# Patient Record
Sex: MALE | Race: WHITE | NOT HISPANIC OR LATINO | Employment: OTHER | URBAN - METROPOLITAN AREA
[De-identification: names, ages, dates, MRNs, and addresses within clinical notes are randomized per-mention and may not be internally consistent; named-entity substitution may affect disease eponyms.]

---

## 2019-01-21 ENCOUNTER — TRANSFERRED RECORDS (OUTPATIENT)
Dept: HEALTH INFORMATION MANAGEMENT | Facility: CLINIC | Age: 64
End: 2019-01-21

## 2019-02-13 ENCOUNTER — ALLIED HEALTH/NURSE VISIT (OUTPATIENT)
Dept: NURSING | Facility: CLINIC | Age: 64
End: 2019-02-13
Payer: COMMERCIAL

## 2019-02-13 DIAGNOSIS — Z23 NEED FOR VACCINATION: Primary | ICD-10-CM

## 2019-02-13 PROCEDURE — 99207 ZZC NO CHARGE LOS: CPT

## 2019-02-13 PROCEDURE — 90750 HZV VACC RECOMBINANT IM: CPT

## 2019-02-13 PROCEDURE — 90471 IMMUNIZATION ADMIN: CPT

## 2019-02-13 NOTE — PROGRESS NOTES
Prior to injection, verified patient identity using patient's name and date of birth.  Due to injection administration, patient instructed to remain in clinic for 15 minutes  afterwards, and to report any adverse reaction to me immediately.    Screening Questionnaire for Adult Immunization    Are you sick today?   No   Do you have allergies to medications, food, a vaccine component or latex?   No   Have you ever had a serious reaction after receiving a vaccination?   No   Do you have a long-term health problem with heart disease, lung disease, asthma, kidney disease, metabolic disease (e.g. diabetes), anemia, or other blood disorder?   No   Do you have cancer, leukemia, HIV/AIDS, or any other immune system problem?   No   In the past 3 months, have you taken medications that affect  your immune system, such as prednisone, other steroids, or anticancer drugs; drugs for the treatment of rheumatoid arthritis, Crohn s disease, or psoriasis; or have you had radiation treatments?   No   Have you had a seizure, or a brain or other nervous system problem?   No   During the past year, have you received a transfusion of blood or blood     products, or been given immune (gamma) globulin or antiviral drug?   No   For women: Are you pregnant or is there a chance you could become        pregnant during the next month?   No   Have you received any vaccinations in the past 4 weeks?   No     Immunization questionnaire answers were all negative.        Per orders of Dr. Dean, injection of Shingrix given by Palomo Florian. Patient instructed to remain in clinic for 15 minutes afterwards, and to report any adverse reaction to me immediately.       Screening performed by Palomo Florian on 2/13/2019 at 4:59 PM.

## 2019-05-13 ENCOUNTER — ALLIED HEALTH/NURSE VISIT (OUTPATIENT)
Dept: NURSING | Facility: CLINIC | Age: 64
End: 2019-05-13
Payer: COMMERCIAL

## 2019-05-13 DIAGNOSIS — Z23 NEED FOR VACCINATION: Primary | ICD-10-CM

## 2019-05-13 PROCEDURE — 90471 IMMUNIZATION ADMIN: CPT

## 2019-05-13 PROCEDURE — 99207 ZZC NO CHARGE NURSE ONLY: CPT

## 2019-05-13 PROCEDURE — 90750 HZV VACC RECOMBINANT IM: CPT

## 2019-05-13 NOTE — NURSING NOTE
Screening Questionnaire for Adult Immunization    Are you sick today?   No   Do you have allergies to medications, food, a vaccine component or latex?   No   Have you ever had a serious reaction after receiving a vaccination?   No   Do you have a long-term health problem with heart disease, lung disease, asthma, kidney disease, metabolic disease (e.g. diabetes), anemia, or other blood disorder?   No   Do you have cancer, leukemia, HIV/AIDS, or any other immune system problem?   No   In the past 3 months, have you taken medications that affect  your immune system, such as prednisone, other steroids, or anticancer drugs; drugs for the treatment of rheumatoid arthritis, Crohn s disease, or psoriasis; or have you had radiation treatments?   No   Have you had a seizure, or a brain or other nervous system problem?   No   During the past year, have you received a transfusion of blood or blood     products, or been given immune (gamma) globulin or antiviral drug?   No   For women: Are you pregnant or is there a chance you could become        pregnant during the next month?   No   Have you received any vaccinations in the past 4 weeks?   No     Immunization questionnaire answers were all negative.        Per orders of Dr. Dean, injection of Shingrix#2 given by Carolina White. Patient instructed to remain in clinic for 15 minutes afterwards, and to report any adverse reaction to me immediately.     Prior to injection, verified patient identity using patient's name and date of birth.  Due to injection administration, patient instructed to remain in clinic for 15 minutes  afterwards, and to report any adverse reaction to me immediately.      Screening performed by Carolina White on 5/13/2019 at 4:32 PM.

## 2019-10-26 ENCOUNTER — ANCILLARY PROCEDURE (OUTPATIENT)
Dept: GENERAL RADIOLOGY | Facility: CLINIC | Age: 64
End: 2019-10-26
Attending: PHYSICIAN ASSISTANT
Payer: COMMERCIAL

## 2019-10-26 ENCOUNTER — OFFICE VISIT (OUTPATIENT)
Dept: URGENT CARE | Facility: URGENT CARE | Age: 64
End: 2019-10-26
Payer: COMMERCIAL

## 2019-10-26 VITALS
SYSTOLIC BLOOD PRESSURE: 104 MMHG | DIASTOLIC BLOOD PRESSURE: 68 MMHG | WEIGHT: 180 LBS | BODY MASS INDEX: 23.11 KG/M2 | HEART RATE: 56 BPM | TEMPERATURE: 98 F

## 2019-10-26 DIAGNOSIS — M79.671 RIGHT FOOT PAIN: ICD-10-CM

## 2019-10-26 DIAGNOSIS — M79.671 RIGHT FOOT PAIN: Primary | ICD-10-CM

## 2019-10-26 LAB
BASOPHILS # BLD AUTO: 0 10E9/L (ref 0–0.2)
BASOPHILS NFR BLD AUTO: 0.4 %
DIFFERENTIAL METHOD BLD: NORMAL
EOSINOPHIL # BLD AUTO: 0.2 10E9/L (ref 0–0.7)
EOSINOPHIL NFR BLD AUTO: 2.7 %
LYMPHOCYTES # BLD AUTO: 1.6 10E9/L (ref 0.8–5.3)
LYMPHOCYTES NFR BLD AUTO: 28 %
MONOCYTES # BLD AUTO: 0.7 10E9/L (ref 0–1.3)
MONOCYTES NFR BLD AUTO: 12.3 %
NEUTROPHILS # BLD AUTO: 3.1 10E9/L (ref 1.6–8.3)
NEUTROPHILS NFR BLD AUTO: 56.6 %
URATE SERPL-MCNC: 4.7 MG/DL (ref 3.5–7.2)
WBC # BLD AUTO: 5.5 10E9/L (ref 4–11)

## 2019-10-26 PROCEDURE — 73630 X-RAY EXAM OF FOOT: CPT | Mod: RT

## 2019-10-26 PROCEDURE — 85004 AUTOMATED DIFF WBC COUNT: CPT | Performed by: PHYSICIAN ASSISTANT

## 2019-10-26 PROCEDURE — 85048 AUTOMATED LEUKOCYTE COUNT: CPT | Performed by: PHYSICIAN ASSISTANT

## 2019-10-26 PROCEDURE — 84550 ASSAY OF BLOOD/URIC ACID: CPT | Performed by: PHYSICIAN ASSISTANT

## 2019-10-26 PROCEDURE — 36415 COLL VENOUS BLD VENIPUNCTURE: CPT | Performed by: PHYSICIAN ASSISTANT

## 2019-10-26 PROCEDURE — 99203 OFFICE O/P NEW LOW 30 MIN: CPT | Performed by: PHYSICIAN ASSISTANT

## 2019-10-26 RX ORDER — UREA 10 %
1 LOTION (ML) TOPICAL
COMMUNITY
Start: 2017-07-20

## 2019-10-26 RX ORDER — INDOMETHACIN 50 MG/1
50 CAPSULE ORAL
Qty: 30 CAPSULE | Refills: 0 | Status: SHIPPED | OUTPATIENT
Start: 2019-10-26 | End: 2021-02-02

## 2019-10-26 RX ORDER — LOSARTAN POTASSIUM 100 MG/1
50 TABLET ORAL DAILY
COMMUNITY
Start: 2019-05-18

## 2019-10-26 NOTE — PATIENT INSTRUCTIONS
Patient Education     Gout    Gout is an inflammation of a joint due to a build-up of gout crystals in the joint fluid. This occurs when there is an excess of uric acid (a normal waste product) in the body. Uric acid builds up in the body when the kidneys are unable to filter enough of it from the blood. This may occur with age. It is also associated with kidney disease. Gout occurs more often in people with obesity, diabetes, high blood pressure, or high levels of fats in the blood. It may run in families. Gout tends to come and go. A flare up of gout is called an attack. Drinking alcohol or eating certain foods (such as shellfish or foods with additives such as high-fructose corn syrup) may increase uric acid levels in the blood and cause a gout attack.  During a gout attack, the affected joint may become a hot, red, swollen and painful. If you have had one attack of gout, you are likely to have another. An attack of gout can be treated with medicine. If these attacks become frequent, a daily medicine may be prescribed to help the kidneys remove uric acid from the body.  Home care  During a gout attack:    Rest painful joints. If gout affects the joints of your foot or leg, you may want to use crutches for the first few days to keep from bearing weight on the affected joint.    When sitting or lying down, raise the painful joint to a level higher than your heart.    Apply an ice pack (ice cubes in a plastic bag wrapped in a thin towel) over the injured area for 20 minutes every 1 to 2 hours the first day for pain relief. Continue this 3 to 4 times a day for swelling and pain.    Avoid alcohol and foods listed below (see Preventing attacks) during a gout attack. Drink extra fluid to help flush the uric acid through your kidneys.    If you were prescribed a medicine to treat gout, take it as your healthcare provider has instructed. Don't skip doses.    Take anti-inflammatory medicine as directed.     If pain  medicines have been prescribed, take them exactly as directed.    Preventing attacks    Minimize or avoid alcohol use. Excess alcohol intake can cause a gout attack.    Limit these foods and beverages:  ? Organ meats, such as kidneys and liver  ? Certain seafoods (anchovies, sardines, shrimp, scallops, herring, mackerel)  ? Wild game, meat extracts and meat gravies  ? Foods and beverages sweetened with high-fructose corn syrup, such as sodas    Eat a healthy diet including low-fat and nonfat dairy, whole grains, and vegetables.    If you are overweight, talk to your healthcare provider about a weight reduction plan. Avoid fasting or extreme low calorie diets (less than 900 calories per day). This will increase uric acid levels in the body.    If you have diabetes or high blood pressure, work with your doctor to manage these conditions.    Protect the joint from injury. Trauma can trigger a gout attack.  Follow-up care  Follow up with your healthcare provider, or as advised.   When to seek medical advice  Call your healthcare provider if you have any of the following:    Fever over 100.4 F (38. C) with worsening joint pain    Increasing redness around the joint    Pain developing in another joint    Repeated vomiting, abdominal pain, or blood in the vomit or stool (black or red color)  Date Last Reviewed: 3/1/2017    0805-1209 [employer]. [address] [place] [address], [place] [id number]. All rights reserved. This information is not intended as a substitute for professional medical care. Always follow your healthcare professional's instructions.           Patient Education     Treating Gout Attacks     Raising the joint above the level of your heart can help reduce gout symptoms.     Gout is a disease that affects the joints. It is caused by excess uric acid in your blood that may lead to crystals forming in your joints. Left untreated, it can lead to painful foot and joint deformities and even kidney  problems. But, by treating gout early, you can relieve pain and help prevent future problems. Gout can usually be treated with medicine and proper diet. In severe cases, surgery may be needed.  Gout attacks are painful and often happen more than once. Taking medicines may reduce pain and prevent attacks in the future. There are also some things you can do at home to relieve symptoms.  Medicines for gout  Your healthcare provider may prescribe a daily medicine to reduce levels of uric acid. Reducing your uric acid levels may help prevent gout attacks. Allopurinol is one commonly used medicine taken daily to reduce uric acid levels. Other daily medicines used to reduce uric acid levels include febuxostat, lesinurad, and probencid. Other medicines can help relieve pain and swelling during an acute attack. Medicines such as NSAIDs (nonsteroidal anti-inflammatory medicines), steroids, and colchicine may be prescribed for intermittent use to relieve an acute gout attack. Be sure to take your medicine as directed.  What you can do  Below are some things you can do at home to relieve gout symptoms. Your healthcare provider may have other tips.    Rest the painful joint as much as you can.    Raise the painful joint so it is at a level higher than your heart.    Use ice for 10 minutes every 1 to 2 hours as possible.  How can I prevent gout?  With a little effort, you may be able to prevent gout attacks in the future. Here are some things you can do:    Don't eat foods high in purines  ? Certain meats (red meat, processed meat, turkey)  ? Organ meats (kidney, liver, sweetbread)  ? Shellfish (lobster, crab, shrimp, scallop, mussel)  ? Certain fish (anchovy, sardine, herring, mackerel)    Take any medicines prescribed by your healthcare provider.    Lose weight if you need to.    Reduce high fructose corn syrup in meals and drinks.    Reduce or cut out alcohol, particularly beer, but also red wine and spirits.    Control blood  pressure, diabetes, and cholesterol.    Drink plenty of water to help flush uric acid from your body.  Date Last Reviewed: 4/1/2018 2000-2018 [employer]. [address] [place] [address], [place] [address]. All rights reserved. This information is not intended as a substitute for professional medical care. Always follow your healthcare professional's instructions.

## 2019-10-26 NOTE — PROGRESS NOTES
Patient presents with:  Urgent Care: right foot pain that started this morning.     SUBJECTIVE:  Chief Complaint   Patient presents with     Urgent Care     right foot pain that started this morning.      Anette Valdes is a 64 year old male presents with a chief complaint of right foot pain, onset this morning.  Denies any injury, however onset was while working in yard today.    Denies any rash or lesions.      He did have some red meat and alcohol recently.    SH: the is here with his wife today.      Past Medical History:   Diagnosis Date     AAA (abdominal aortic aneurysm) (H) 2012 and 2013    had endovascular repair 11/12 at [facility].  Had open repair 12/13 at [facility] with post op ileus     B12 deficiency 2016    found at [facility] [place], started po and shots     Dilated aortic root (H) 1/16    4.3 on echo, found on est echo done for l.h.     Elevated blood sugar      Elevated MCV 1/15     Light headed 12/15    neg est echo     SBO (small bowel obstruction) (H) 2007    due to volvulus, had surgery     Splenic artery aneurysm (H) 2007    had embolization     Patient Active Problem List   Diagnosis     Splenic artery aneurysm (H)     CARDIOVASCULAR SCREENING; LDL GOAL LESS THAN 160     Elevated MCV     Elevated blood sugar     Dilated aortic root (H)     Abdominal aortic aneurysm without rupture (H)     Vitamin B12 deficiency (non anemic)     Social History     Tobacco Use     Smoking status: Never Smoker     Smokeless tobacco: Never Used   Substance Use Topics     Alcohol use: Yes     Alcohol/week: 10.0 standard drinks     Types: 10 Standard drinks or equivalent per week       ROS:  CONSTITUTIONAL:NEGATIVE for fever, chills, change in weight  INTEGUMENTARY/SKIN: NEGATIVE for worrisome rashes, moles or lesions  EYES: NEGATIVE for vision changes or irritation  ENT/MOUTH: NEGATIVE for ear, mouth and throat problems  RESP:NEGATIVE for significant cough or SOB  CV: NEGATIVE for chest pain, palpitations or peripheral  edema  GI: NEGATIVE for nausea, abdominal pain, heartburn, or change in bowel habits  : negative for dysuria, hematuria, decreased urinary stream, erectile dysfunction  MUSCULOSKELETAL: as per HPI  NEURO: NEGATIVE for weakness, dizziness or paresthesias  Review of systems negative except as stated above.    EXAM:   /68   Pulse 56   Temp 98  F (36.7  C) (Oral)   Wt 81.6 kg (180 lb)   BMI 23.11 kg/m    Gen: healthy,alert,no distress  Extremity: Right foot:  has tenderness at mid lateral foot without erythema or swelling.   There is not compromise to the distal circulation.  Pulses are +2 and CRT is brisk  SKIN: no suspicious lesions or rashes  NEURO: Normal strength and tone, sensory exam grossly normal, mentation intact and speech normal    X-RAY was done.  No fractures as per my interpretation during clinic visit     (M79.671) Right foot pain  (primary encounter diagnosis)  Comment: consistent with possible gout  Plan: Uric acid, WBC with Diff, XR Foot Right G/E 3         Views, indomethacin (INDOCIN) 50 MG capsule          See handout

## 2020-01-19 ENCOUNTER — TRANSFERRED RECORDS (OUTPATIENT)
Dept: HEALTH INFORMATION MANAGEMENT | Facility: CLINIC | Age: 65
End: 2020-01-19

## 2021-02-02 ENCOUNTER — OFFICE VISIT (OUTPATIENT)
Dept: FAMILY MEDICINE | Facility: CLINIC | Age: 66
End: 2021-02-02
Payer: COMMERCIAL

## 2021-02-02 VITALS
OXYGEN SATURATION: 99 % | HEIGHT: 74 IN | DIASTOLIC BLOOD PRESSURE: 72 MMHG | HEART RATE: 61 BPM | WEIGHT: 178 LBS | TEMPERATURE: 97 F | BODY MASS INDEX: 22.84 KG/M2 | SYSTOLIC BLOOD PRESSURE: 108 MMHG

## 2021-02-02 DIAGNOSIS — R73.9 ELEVATED BLOOD SUGAR: ICD-10-CM

## 2021-02-02 DIAGNOSIS — R71.8 ELEVATED MCV: ICD-10-CM

## 2021-02-02 DIAGNOSIS — E53.8 VITAMIN B12 DEFICIENCY (NON ANEMIC): ICD-10-CM

## 2021-02-02 DIAGNOSIS — I71.40 ABDOMINAL AORTIC ANEURYSM WITHOUT RUPTURE (H): ICD-10-CM

## 2021-02-02 DIAGNOSIS — G30.8 ALZHEIMER'S DISEASE OF OTHER ONSET WITHOUT BEHAVIORAL DISTURBANCE: ICD-10-CM

## 2021-02-02 DIAGNOSIS — I77.810 DILATED AORTIC ROOT (H): ICD-10-CM

## 2021-02-02 DIAGNOSIS — I72.8 SPLENIC ARTERY ANEURYSM (H): ICD-10-CM

## 2021-02-02 DIAGNOSIS — F02.80 ALZHEIMER'S DISEASE OF OTHER ONSET WITHOUT BEHAVIORAL DISTURBANCE: ICD-10-CM

## 2021-02-02 DIAGNOSIS — Z00.00 ROUTINE GENERAL MEDICAL EXAMINATION AT A HEALTH CARE FACILITY: Primary | ICD-10-CM

## 2021-02-02 DIAGNOSIS — Q87.40 MARFAN'S SYNDROME: ICD-10-CM

## 2021-02-02 DIAGNOSIS — R63.4 WEIGHT LOSS: ICD-10-CM

## 2021-02-02 LAB
ALBUMIN SERPL-MCNC: 3.8 G/DL (ref 3.4–5)
ALP SERPL-CCNC: 46 U/L (ref 40–150)
ALT SERPL W P-5'-P-CCNC: 20 U/L (ref 0–70)
ANION GAP SERPL CALCULATED.3IONS-SCNC: 3 MMOL/L (ref 3–14)
AST SERPL W P-5'-P-CCNC: 18 U/L (ref 0–45)
BASOPHILS # BLD AUTO: 0 10E9/L (ref 0–0.2)
BASOPHILS NFR BLD AUTO: 0.6 %
BILIRUB SERPL-MCNC: 0.6 MG/DL (ref 0.2–1.3)
BUN SERPL-MCNC: 30 MG/DL (ref 7–30)
CALCIUM SERPL-MCNC: 9.4 MG/DL (ref 8.5–10.1)
CHLORIDE SERPL-SCNC: 108 MMOL/L (ref 94–109)
CHOLEST SERPL-MCNC: 173 MG/DL
CO2 SERPL-SCNC: 27 MMOL/L (ref 20–32)
CREAT SERPL-MCNC: 0.9 MG/DL (ref 0.66–1.25)
DIFFERENTIAL METHOD BLD: ABNORMAL
EOSINOPHIL # BLD AUTO: 0.1 10E9/L (ref 0–0.7)
EOSINOPHIL NFR BLD AUTO: 1 %
ERYTHROCYTE [DISTWIDTH] IN BLOOD BY AUTOMATED COUNT: 12.4 % (ref 10–15)
GFR SERPL CREATININE-BSD FRML MDRD: 90 ML/MIN/{1.73_M2}
GLUCOSE SERPL-MCNC: 86 MG/DL (ref 70–99)
HCT VFR BLD AUTO: 41.7 % (ref 40–53)
HCV AB SERPL QL IA: NONREACTIVE
HDLC SERPL-MCNC: 54 MG/DL
HGB BLD-MCNC: 14 G/DL (ref 13.3–17.7)
HIV 1+2 AB+HIV1 P24 AG SERPL QL IA: NONREACTIVE
LDLC SERPL CALC-MCNC: 102 MG/DL
LYMPHOCYTES # BLD AUTO: 1.3 10E9/L (ref 0.8–5.3)
LYMPHOCYTES NFR BLD AUTO: 26.6 %
MCH RBC QN AUTO: 33.4 PG (ref 26.5–33)
MCHC RBC AUTO-ENTMCNC: 33.6 G/DL (ref 31.5–36.5)
MCV RBC AUTO: 100 FL (ref 78–100)
MONOCYTES # BLD AUTO: 0.6 10E9/L (ref 0–1.3)
MONOCYTES NFR BLD AUTO: 12.2 %
NEUTROPHILS # BLD AUTO: 2.9 10E9/L (ref 1.6–8.3)
NEUTROPHILS NFR BLD AUTO: 59.6 %
NONHDLC SERPL-MCNC: 119 MG/DL
PLATELET # BLD AUTO: 177 10E9/L (ref 150–450)
POTASSIUM SERPL-SCNC: 4.4 MMOL/L (ref 3.4–5.3)
PROT SERPL-MCNC: 6.7 G/DL (ref 6.8–8.8)
RBC # BLD AUTO: 4.19 10E12/L (ref 4.4–5.9)
SODIUM SERPL-SCNC: 138 MMOL/L (ref 133–144)
TRIGL SERPL-MCNC: 83 MG/DL
TSH SERPL DL<=0.005 MIU/L-ACNC: 0.78 MU/L (ref 0.4–4)
VIT B12 SERPL-MCNC: 2162 PG/ML (ref 193–986)
WBC # BLD AUTO: 4.9 10E9/L (ref 4–11)

## 2021-02-02 PROCEDURE — 86803 HEPATITIS C AB TEST: CPT | Performed by: INTERNAL MEDICINE

## 2021-02-02 PROCEDURE — 80050 GENERAL HEALTH PANEL: CPT | Performed by: INTERNAL MEDICINE

## 2021-02-02 PROCEDURE — 36415 COLL VENOUS BLD VENIPUNCTURE: CPT | Performed by: INTERNAL MEDICINE

## 2021-02-02 PROCEDURE — 87389 HIV-1 AG W/HIV-1&-2 AB AG IA: CPT | Performed by: INTERNAL MEDICINE

## 2021-02-02 PROCEDURE — 99386 PREV VISIT NEW AGE 40-64: CPT | Performed by: INTERNAL MEDICINE

## 2021-02-02 PROCEDURE — 82607 VITAMIN B-12: CPT | Performed by: INTERNAL MEDICINE

## 2021-02-02 PROCEDURE — 80061 LIPID PANEL: CPT | Performed by: INTERNAL MEDICINE

## 2021-02-02 SDOH — HEALTH STABILITY: MENTAL HEALTH: HOW OFTEN DO YOU HAVE A DRINK CONTAINING ALCOHOL?: NOT ASKED

## 2021-02-02 SDOH — HEALTH STABILITY: MENTAL HEALTH: HOW OFTEN DO YOU HAVE 6 OR MORE DRINKS ON ONE OCCASION?: NOT ASKED

## 2021-02-02 SDOH — HEALTH STABILITY: MENTAL HEALTH: HOW MANY STANDARD DRINKS CONTAINING ALCOHOL DO YOU HAVE ON A TYPICAL DAY?: NOT ASKED

## 2021-02-02 ASSESSMENT — MIFFLIN-ST. JEOR: SCORE: 1672.15

## 2021-02-02 NOTE — PROGRESS NOTES
SUBJECTIVE:   CC: Anette Valdes is an 65 year old male who presents for preventative health visit.     I have not seen this patient for over 3 years.  He has had [facility] follow up.  I reviewed tose notes.  Patient was dx with alz as noted and also Marmirandas, just had follow up on the latter and scans all stable.  Not on meds for the alz.  He is , 2 grown kids.   He offers no c/o on review of systems, works out reg    I also spoke with his wife today.        Patient has been advised of split billing requirements and indicates understanding: Yes  Healthy Habits:    Getting at least 3 servings of Calcium per day:  Yes    Bi-annual eye exam:  Yes    Dental care twice a year:  Yes    Sleep apnea or symptoms of sleep apnea:  None    Diet:  Regular (no restrictions)    Frequency of exercise:  6-7 days/week    Duration of exercise:  45-60 minutes    Taking medications regularly:  Yes    Barriers to taking medications:  None    Medication side effects:  None    PHQ-2 Total Score:    Additional concerns today:  No              Today's PHQ-2 Score:   PHQ-2 ( 1999 [employer]) 9/8/2016   Q1: Little interest or pleasure in doing things 0   Q2: Feeling down, depressed or hopeless 0   PHQ-2 Score 0       Abuse: Current or Past(Physical, Sexual or Emotional)- No  Do you feel safe in your environment? Yes    Have you ever done Advance Care Planning? (For example, a Health Directive, POLST, or a discussion with a medical provider or your loved ones about your wishes): Yes, advance care planning is on file.    Social History     Tobacco Use     Smoking status: Never Smoker     Smokeless tobacco: Never Used   Substance Use Topics     Alcohol use: Yes     Alcohol/week: 10.0 standard drinks     Types: 10 Standard drinks or equivalent per week     If you drink alcohol do you typically have >3 drinks per day or >7 drinks per week? No               Past Medical History:      Past Medical History:   Diagnosis Date     AAA (abdominal aortic  aneurysm) (H) 2012 and 2013    had endovascular repair 11/12 at [facility].  Had open repair 12/13 at [facility] with post op ileus     Alzheimer's disease (H)     dx [place]     B12 deficiency 01/01/2016    found at [facility] [place], started po and shots     Dilated aortic root (H) 01/01/2016    4.3 on echo, found on est echo done for l.h.     Elevated blood sugar      Elevated MCV 01/01/2015     Light headed 12/01/2015    neg est echo     Marfan's syndrome 2020    dx [place]     SBO (small bowel obstruction) (H) 01/01/2007    due to volvulus, had surgery     Splenic artery aneurysm (H) 01/01/2007    had embolization             Past Surgical History:      Past Surgical History:   Procedure Laterality Date     bunion surgery  2008     CATARACT IOL, RT/LT  1994     ENDOVASCULAR REPAIR ANEURYSM ABDOMINAL AORTA  11/12    done at [facility] for aaa     FOOT SURGERY  2000    due to fx     left knee surgery  1998 and 1979     OTHER SURGICAL HISTORY  12/13    done at [facility], open repair of aaa     ROTATOR CUFF REPAIR RT/LT  2001     splenic artery embolization  2007     varocose vein repaired  1970's and 1980     volvulus surgery  2007             Social History:     Social History     Socioeconomic History     Marital status:      Spouse name: Not on file     Number of children: 2     Years of education: Not on file     Highest education level: Not on file   Occupational History     Not on file   Social Needs     Financial resource strain: Not on file     Food insecurity     Worry: Not on file     Inability: Not on file     Transportation needs     Medical: Not on file     Non-medical: Not on file   Tobacco Use     Smoking status: Never Smoker     Smokeless tobacco: Never Used   Substance and Sexual Activity     Alcohol use: Yes     Comment: minimal     Drug use: No     Sexual activity: Yes     Partners: Female   Lifestyle     Physical activity     Days per week: Not on file     Minutes per session: Not on file     Stress: Not on file  "  Relationships     Social connections     Talks on phone: Not on file     Gets together: Not on file     Attends Hinduism service: Not on file     Active member of club or organization: Not on file     Attends meetings of clubs or organizations: Not on file     Relationship status: Not on file     Intimate partner violence     Fear of current or ex partner: Not on file     Emotionally abused: Not on file     Physically abused: Not on file     Forced sexual activity: Not on file   Other Topics Concern     Not on file   Social History Narrative     Not on file             Family History:   reviewed         Allergies:     Allergies   Allergen Reactions     No Known Allergies              Medications:     Current Outpatient Medications   Medication Sig Dispense Refill     cyanocobalamin (VITAMIN B-12) 500 MCG tablet Take 1 tablet by mouth       indomethacin (INDOCIN) 50 MG capsule Take 1 capsule (50 mg) by mouth 3 times daily (with meals) 30 capsule 0     losartan (COZAAR) 100 MG tablet Take 50 mg by mouth daily        NO ACTIVE MEDICATIONS                  Review of Systems:   The 10 point Review of Systems is negative other than noted in the HPI           Physical Exam:   Blood pressure 108/72, pulse 61, temperature 97  F (36.1  C), temperature source Oral, height 1.88 m (6' 2\"), weight 80.7 kg (178 lb), SpO2 99 %.    Exam:  Constitutional: healthy appearing, alert and in no distress  Heent: Normocephalic. Head without obvious masses or lesions. PERRLDC, EOMI. Mouth exam within normal limits: tongue, mucous membranes, posterior pharynx all normal, no lesions or abnormalities seen.  Tm's and canals within normal limits bilaterally. Neck supple, no nuchal rigidity or masses. No supraclavicular, or cervical adenopathy. Thyroid symmetric, no masses.  Cardiovascular: Regular rate and rhythm, no murmer, rub or gallops.  JVP not elevated, no edema.  Carotids within normal limits bilaterally, no bruits.  Respiratory: Normal " respiratory effort.  Lungs clear, normal flow, no wheezing or crackles.  Breasts: Normal bilaterally.  No masses or lesions.  Nipples within normal limites.  No axillary lesions or nodes.  Gastrointestinal: Normal active bowel sounds.   Soft, not tender, no masses, guarding or rebound.  No hepatosplenomegaly.   Genitourinary: Rectal min bph  Musculoskeletal: extremities normal, no gross deformities noted.  Skin: no suspicious lesions or rashes   Neurologic: Mental status within normal limits.  Speech fluent.  No gross motor abnormalities and gait intact.  Psychiatric: mentation appears normal and affect normal.         Data:   Labs sent        Assessment:   1. Normal complete physical exam  2. Alz, follow up [place]  3. Marfan's with vascular dz as noted, sstable and reg [place] follow up  4. Weight loss, suspect due to alz, doubt pathologic, check labs  5. Low b12, follow up labs  6. Elevated mcv, sugar, follow up labs  77. hcm         Plan:   Letter with labs  Exercise  Call if more weight loss  Fit testing  Up to date immunizations       Dawson Dean M.D.

## 2021-02-02 NOTE — LETTER
[facility]  [address] [facility]  [address]  [place]  [address]  Tel: [phone]    February 3, 2021    Anette CED Valdes  [address] [facility] [address]        Dear Mr. Valdes,    It was a pleasure seeing you for your physical examination.  I wanted to get back to you with your test results.  I have enclosed a copy for your review.       I am happy to report that your cbc or complete blood count is normal with no signs of anemia, leukemia or platelet abnormalities.  Your chemistry panel shows no signs of diabetes.  Your blood salts, kidney tests, liver tests, hiv test, hepatitis C test, thyroid test, and proteins are all fine.  Your b12 level is elevated which is not a problem, but you cold take the b12 every other day.     Your total cholesterol is 173 with the normal range being below 200.  Your HDL or good cholesterol is 54 with the normal range being above 40.  Your LDL or bad cholesterol is 102 with the normal range being below 130.  These numbers are very good.     I am happy to bring you this overall excellent report.  If you have any questions please call me.       Sincerely,    Dawson Dean MD/ALEXUS          Enclosure: Lab Results  Results for orders placed or performed in visit on 02/02/21   Comprehensive metabolic panel     Status: Abnormal   Result Value Ref Range    Sodium 138 133 - 144 mmol/L    Potassium 4.4 3.4 - 5.3 mmol/L    Chloride 108 94 - 109 mmol/L    Carbon Dioxide 27 20 - 32 mmol/L    Anion Gap 3 3 - 14 mmol/L    Glucose 86 70 - 99 mg/dL    Urea Nitrogen 30 7 - 30 mg/dL    Creatinine 0.90 0.66 - 1.25 mg/dL    GFR Estimate 90 >60 mL/min/[1.73_m2]    GFR Estimate If Black >90 >60 mL/min/[1.73_m2]    Calcium 9.4 8.5 - 10.1 mg/dL    Bilirubin Total 0.6 0.2 - 1.3 mg/dL    Albumin 3.8 3.4 - 5.0 g/dL    Protein Total 6.7 (L) 6.8 - 8.8 g/dL    Alkaline Phosphatase 46 40 - 150 U/L    ALT 20 0 - 70 U/L    AST 18 0 - 45 U/L   CBC with platelets differential     Status: Abnormal    Result Value Ref Range    WBC 4.9 4.0 - 11.0 10e9/L    RBC Count 4.19 (L) 4.4 - 5.9 10e12/L    Hemoglobin 14.0 13.3 - 17.7 g/dL    Hematocrit 41.7 40.0 - 53.0 %     78 - 100 fl    MCH 33.4 (H) 26.5 - 33.0 pg    MCHC 33.6 31.5 - 36.5 g/dL    RDW 12.4 10.0 - 15.0 %    Platelet Count 177 150 - 450 10e9/L    % Neutrophils 59.6 %    % Lymphocytes 26.6 %    % Monocytes 12.2 %    % Eosinophils 1.0 %    % Basophils 0.6 %    Absolute Neutrophil 2.9 1.6 - 8.3 10e9/L    Absolute Lymphocytes 1.3 0.8 - 5.3 10e9/L    Absolute Monocytes 0.6 0.0 - 1.3 10e9/L    Absolute Eosinophils 0.1 0.0 - 0.7 10e9/L    Absolute Basophils 0.0 0.0 - 0.2 10e9/L    Diff Method Automated Method    Lipid panel reflex to direct LDL Non-fasting     Status: Abnormal   Result Value Ref Range    Cholesterol 173 <200 mg/dL    Triglycerides 83 <150 mg/dL    HDL Cholesterol 54 >39 mg/dL    LDL Cholesterol Calculated 102 (H) <100 mg/dL    Non HDL Cholesterol 119 <130 mg/dL   HIV Antigen Antibody Combo     Status: None   Result Value Ref Range    HIV Antigen Antibody Combo Nonreactive NR^Nonreactive       Hepatitis C Screen Reflex to HCV RNA Quant and Genotype     Status: None   Result Value Ref Range    Hepatitis C Antibody Nonreactive NR^Nonreactive   Vitamin B12     Status: Abnormal   Result Value Ref Range    Vitamin B12 2,162 (H) 193 - 986 pg/mL   TSH with free T4 reflex     Status: None   Result Value Ref Range    TSH 0.78 0.40 - 4.00 mU/L

## 2021-02-03 NOTE — RESULT ENCOUNTER NOTE
It was a pleasure seeing you for your physical examination.  I wanted to get back to you with your test results.  I have enclosed a copy for your review.      I am happy to report that your cbc or complete blood count is normal with no signs of anemia, leukemia or platelet abnormalities.  Your chemistry panel shows no signs of diabetes.  Your blood salts, kidney tests, liver tests, hiv test, hepatitis C test, thyroid test, and proteins are all fine.  Your b12 level is elevated which is not a problem, but you cold take the b12 every other day.    Your total cholesterol is 173 with the normal range being below 200.  Your HDL or good cholesterol is 54 with the normal range being above 40.  Your LDL or bad cholesterol is 102 with the normal range being below 130.  These numbers are very good.    I am happy to bring you this overall excellent report.  If you have any questions please call me.

## 2021-09-04 ENCOUNTER — HEALTH MAINTENANCE LETTER (OUTPATIENT)
Age: 66
End: 2021-09-04

## 2021-09-18 ENCOUNTER — HOSPITAL ENCOUNTER (EMERGENCY)
Dept: HOSPITAL 11 - JP.ED | Age: 66
Discharge: HOME | End: 2021-09-18
Payer: COMMERCIAL

## 2021-09-18 ENCOUNTER — APPOINTMENT (OUTPATIENT)
Dept: MRI IMAGING | Facility: CLINIC | Age: 66
End: 2021-09-18
Attending: EMERGENCY MEDICINE
Payer: COMMERCIAL

## 2021-09-18 ENCOUNTER — HOSPITAL ENCOUNTER (EMERGENCY)
Facility: CLINIC | Age: 66
Discharge: HOME | End: 2021-09-19
Attending: EMERGENCY MEDICINE | Admitting: EMERGENCY MEDICINE
Payer: COMMERCIAL

## 2021-09-18 ENCOUNTER — NURSE TRIAGE (OUTPATIENT)
Dept: NURSING | Facility: CLINIC | Age: 66
End: 2021-09-18

## 2021-09-18 ENCOUNTER — TELEPHONE (OUTPATIENT)
Dept: INTERNAL MEDICINE | Facility: CLINIC | Age: 66
End: 2021-09-18

## 2021-09-18 DIAGNOSIS — Z79.899: ICD-10-CM

## 2021-09-18 DIAGNOSIS — R26.81 UNSTEADY GAIT: ICD-10-CM

## 2021-09-18 DIAGNOSIS — R47.81 SLURRED SPEECH: ICD-10-CM

## 2021-09-18 DIAGNOSIS — R47.1: Primary | ICD-10-CM

## 2021-09-18 LAB
ANION GAP SERPL CALCULATED.3IONS-SCNC: 6 MMOL/L (ref 3–14)
APTT PPP: 27 SECONDS (ref 22–38)
BASOPHILS # BLD AUTO: 0 10E3/UL (ref 0–0.2)
BASOPHILS NFR BLD AUTO: 0 %
BUN SERPL-MCNC: 25 MG/DL (ref 7–30)
CALCIUM SERPL-MCNC: 8.4 MG/DL (ref 8.5–10.1)
CHLORIDE BLD-SCNC: 113 MMOL/L (ref 94–109)
CO2 SERPL-SCNC: 27 MMOL/L (ref 20–32)
CREAT SERPL-MCNC: 1.01 MG/DL (ref 0.66–1.25)
EOSINOPHIL # BLD AUTO: 0.1 10E3/UL (ref 0–0.7)
EOSINOPHIL NFR BLD AUTO: 2 %
ERYTHROCYTE [DISTWIDTH] IN BLOOD BY AUTOMATED COUNT: 13 % (ref 10–15)
GFR SERPL CREATININE-BSD FRML MDRD: 78 ML/MIN/1.73M2
GLUCOSE BLD-MCNC: 101 MG/DL (ref 70–99)
HCT VFR BLD AUTO: 40.1 % (ref 40–53)
HGB BLD-MCNC: 13.2 G/DL (ref 13.3–17.7)
IMM GRANULOCYTES # BLD: 0 10E3/UL
IMM GRANULOCYTES NFR BLD: 0 %
INR PPP: 1.04 (ref 0.85–1.15)
LYMPHOCYTES # BLD AUTO: 1.8 10E3/UL (ref 0.8–5.3)
LYMPHOCYTES NFR BLD AUTO: 36 %
MCH RBC QN AUTO: 32.3 PG (ref 26.5–33)
MCHC RBC AUTO-ENTMCNC: 32.9 G/DL (ref 31.5–36.5)
MCV RBC AUTO: 98 FL (ref 78–100)
MONOCYTES # BLD AUTO: 0.4 10E3/UL (ref 0–1.3)
MONOCYTES NFR BLD AUTO: 9 %
NEUTROPHILS # BLD AUTO: 2.6 10E3/UL (ref 1.6–8.3)
NEUTROPHILS NFR BLD AUTO: 53 %
NRBC # BLD AUTO: 0 10E3/UL
NRBC BLD AUTO-RTO: 0 /100
PLATELET # BLD AUTO: 196 10E3/UL (ref 150–450)
POTASSIUM BLD-SCNC: 4 MMOL/L (ref 3.4–5.3)
RBC # BLD AUTO: 4.09 10E6/UL (ref 4.4–5.9)
SODIUM SERPL-SCNC: 146 MMOL/L (ref 133–144)
TROPONIN I SERPL-MCNC: <0.015 UG/L (ref 0–0.04)
WBC # BLD AUTO: 4.9 10E3/UL (ref 4–11)

## 2021-09-18 PROCEDURE — 80053 COMPREHEN METABOLIC PANEL: CPT

## 2021-09-18 PROCEDURE — 85610 PROTHROMBIN TIME: CPT | Performed by: EMERGENCY MEDICINE

## 2021-09-18 PROCEDURE — 85025 COMPLETE CBC W/AUTO DIFF WBC: CPT | Performed by: EMERGENCY MEDICINE

## 2021-09-18 PROCEDURE — 99285 EMERGENCY DEPT VISIT HI MDM: CPT | Mod: 25

## 2021-09-18 PROCEDURE — 85025 COMPLETE CBC W/AUTO DIFF WBC: CPT

## 2021-09-18 PROCEDURE — 70553 MRI BRAIN STEM W/O & W/DYE: CPT

## 2021-09-18 PROCEDURE — 70450 CT HEAD/BRAIN W/O DYE: CPT

## 2021-09-18 PROCEDURE — 36415 COLL VENOUS BLD VENIPUNCTURE: CPT | Performed by: EMERGENCY MEDICINE

## 2021-09-18 PROCEDURE — 93005 ELECTROCARDIOGRAM TRACING: CPT

## 2021-09-18 PROCEDURE — 84484 ASSAY OF TROPONIN QUANT: CPT | Performed by: EMERGENCY MEDICINE

## 2021-09-18 PROCEDURE — 70549 MR ANGIOGRAPH NECK W/O&W/DYE: CPT

## 2021-09-18 PROCEDURE — 99285 EMERGENCY DEPT VISIT HI MDM: CPT

## 2021-09-18 PROCEDURE — 36415 COLL VENOUS BLD VENIPUNCTURE: CPT

## 2021-09-18 PROCEDURE — 82565 ASSAY OF CREATININE: CPT | Performed by: EMERGENCY MEDICINE

## 2021-09-18 PROCEDURE — 70544 MR ANGIOGRAPHY HEAD W/O DYE: CPT

## 2021-09-18 PROCEDURE — 85730 THROMBOPLASTIN TIME PARTIAL: CPT | Performed by: EMERGENCY MEDICINE

## 2021-09-18 RX ORDER — SODIUM CHLORIDE 9 MG/ML
INJECTION, SOLUTION INTRAVENOUS CONTINUOUS PRN
Status: DISCONTINUED | OUTPATIENT
Start: 2021-09-18 | End: 2021-09-19 | Stop reason: HOSPADM

## 2021-09-18 RX ORDER — GADOBUTROL 604.72 MG/ML
10 INJECTION INTRAVENOUS ONCE
Status: COMPLETED | OUTPATIENT
Start: 2021-09-18 | End: 2021-09-19

## 2021-09-18 ASSESSMENT — MIFFLIN-ST. JEOR: SCORE: 1571.9

## 2021-09-18 NOTE — EDM.PDOC
ED HPI GENERAL MEDICAL PROBLEM





- General


Chief Complaint: Neuro Symptoms/Deficits


Stated Complaint: POSSIBLE STROKE


Time Seen by Provider: 09/18/21 16:50


Source of Information: Reports: Patient, Family


History Limitations: Reports: Altered Mental Status, Physical Impairment





- History of Present Illness


INITIAL COMMENTS - FREE TEXT/NARRATIVE: 





66-year-old male, a patient who does have early onset dementia and is already on

Aricept is up in the area fishing and golfing with friends and family.  They 

played some cards and had some drinks last night, he did not have more alcohol 

than usual, and went to bed in his normal state.  There was some commotion 

overnight where they heard some bumping and noises around 4 to 5AM and this 

morning they saw some furniture moved and tipped and some mess in the bathroom 

that apparently was made by the patient.  They tried to get him up for breakfast

and he did not feel well, was weaker than usual and unsteady and seemed to have 

some dysarthria.  They laid him back down for a nap while they went fishing and 

when they returned at 1:00 he had not improved so they discussed his condition 

with his wife and she recommended he be checked out.  At this time he still has 

some dysarthria but really no aphasia, his words are coming out correctly just 

somewhat slurred like he is "drunk".  Still having some trouble walking as he is

very unsteady, but no focal weakness such as arm or leg weakness.  They just 

want him checked out to make sure nothing needs to be done urgently.  Symptom 

onset is probably over 12 hours ago.


Onset: Unknown/Unsure (Patient woke up with symptoms)


Duration: Hour(s): (Probably 12 hours)


Location: Reports: Generalized


Associated Symptoms: Reports: Confusion (Mild confusion which is chronic, 

somewhat increased over baseline), Weakness, Other (Mild dysarthria).  Denies: 

Cough, Headaches, Malaise, Nausea/Vomiting, Shortness of Breath





- Related Data


                                    Allergies











Allergy/AdvReac Type Severity Reaction Status Date / Time


 


No Known Allergies Allergy   Verified 09/18/21 16:25











Home Meds: 


                                    Home Meds





Donepezil HCl [Aricept] 10 mg PO BEDTIME 09/18/21 [History]


Losartan [Cozaar] 50 mg PO DAILY 09/18/21 [History]











Past Medical History


Cardiovascular History: Reports: Stents, Other (See Below)


Other Cardiovascular History: marfan syndrome- aortic enlargement, valve 

involvement


Neurological History: Reports: Other (See Below)


Other Neuro History: minor cognitive impairment that is followed q6mos by [place], 

has been stable





- Infectious Disease History


Infectious Disease History: Reports: C-Difficile, Measles





- Past Surgical History


Musculoskeletal Surgical History: Reports: Other (See Below)


Other Musculoskeletal Surgeries/Procedures:: left foot surgery





Social & Family History





- Tobacco Use


Tobacco Use Status *Q: Never Tobacco User





- Recreational Drug Use


Recreational Drug Use: No





ED ROS GENERAL





- Review of Systems


Review Of Systems: See Below


Constitutional: Denies: Fever, Chills, Malaise


HEENT: Denies: Vision Change (Denies double vision or blurred vision)


Respiratory: Denies: Shortness of Breath


Cardiovascular: Denies: Chest Pain


GI/Abdominal: Denies: Diarrhea, Nausea, Vomiting


Musculoskeletal: Reports: Other


Skin: Reports: No Symptoms





ED EXAM, GENERAL





- Physical Exam


Exam: See Below


Exam Limited By: No Limitations


General Appearance: Alert, No Apparent Distress


Eye Exam: Bilateral Eye: EOMI (Tracks normally), PERRL


Head: Atraumatic


Neck: Normal Inspection, Supple, Non-Tender


Respiratory/Chest: Lungs Clear


Cardiovascular: Regular Rate, Rhythm.  No: Tachycardia


GI/Abdominal: Soft, Non-Tender


Extremities: Normal Inspection, Other (Grasp strength is equal, he can hold his 

legs up against gravity equally.  Speech is dysarthric)


Neurological: Alert, Disoriented (Disoriented to time which is not real unusual 

for him).  No: Oriented


Psychiatric: Normal Affect, Normal Mood


Skin Exam: Warm, Dry





Course





- Vital Signs


Last Recorded V/S: 


                                Last Vital Signs











Temp  97.6 F   09/18/21 16:24


 


Pulse  75   09/18/21 18:13


 


Resp  16   09/18/21 16:24


 


BP  121/78   09/18/21 18:13


 


Pulse Ox  95   09/18/21 18:13














- Orders/Labs/Meds


Labs: 


                                Laboratory Tests











  09/18/21 09/18/21 Range/Units





  16:45 17:22 


 


WBC  5.0   (4.5-11.0)  K/uL


 


RBC  4.22 L   (4.30-5.90)  M/uL


 


Hgb  13.7   (12.0-15.0)  g/dL


 


Hct  40.8   (40.0-54.0)  %


 


MCV  97   (80-98)  fL


 


MCH  33 H   (27-31)  pg


 


MCHC  34   (32-36)  %


 


Plt Count  200   (150-400)  K/uL


 


Neut % (Auto)  51.8   (36-66)  %


 


Lymph % (Auto)  36.9   (24-44)  %


 


Mono % (Auto)  9.5 H   (2-6)  %


 


Eos % (Auto)  1.2 L   (2-4)  %


 


Baso % (Auto)  0.6   (0-1)  %


 


Sodium   145  (140-148)  mmol/L


 


Potassium   3.7  (3.6-5.2)  mmol/L


 


Chloride   109 H  (100-108)  mmol/L


 


Carbon Dioxide   29  (21-32)  mmol/L


 


Anion Gap   10.7  (5.0-14.0)  mmol/L


 


BUN   20 H  (7-18)  mg/dL


 


Creatinine   0.8  (0.8-1.3)  mg/dL


 


Est Cr Clr Drug Dosing   93.96  mL/min


 


Estimated GFR (MDRD)   > 60  (>60)  


 


Glucose   104  ()  mg/dL


 


Calcium   8.4 L  (8.5-10.1)  mg/dL


 


Total Bilirubin   0.2  (0.2-1.0)  mg/dL


 


AST   21  (15-37)  U/L


 


ALT   22  (12-78)  U/L


 


Alkaline Phosphatase   51  ()  U/L


 


Total Protein   6.3 L  (6.4-8.2)  g/dL


 


Albumin   3.6  (3.4-5.0)  g/dL


 


Globulin   2.7  (2.3-3.5)  g/dL


 


Albumin/Globulin Ratio   1.3  (1.2-2.2)  











Meds: 


Medications














Discontinued Medications














Generic Name Dose Route Start Last Admin





  Trade Name Freq  PRN Reason Stop Dose Admin


 


Aspirin  325 mg  09/18/21 18:13  09/18/21 18:21





  Aspirin 325 Mg Tab.Ec  PO  09/18/21 18:14  325 mg





  ONETIME ONE   Administration














- Re-Assessments/Exams


Free Text/Narrative Re-Assessment/Exam: 





09/18/21 17:38


CBC CMP were obtained as well as a head CT without contrast.


09/18/21 17:57





IMPRESSION:


Unremarkable noncontrast head CT.





Labs were also reassuring.  Because symptoms started as much is 14 to 15 hours 

ago, his head CT is normal and he is stable, I do not think further evaluation 

or hospitalization is necessary at this time.  He was given a copy of his CT and

 all labs, and should recheck when home and get an MRI in the next several days.

  He was also given a full dose aspirin and will continue a daily aspirin until 

rechecked.








Departure





- Departure


Time of Disposition: 18:30


Disposition: Home, Self-Care 01


Clinical Impression: 


 Dysarthria, Loss of balance








- Discharge Information


Instructions:  Confusion, Dizziness, Easy-to-Read


Referrals: 


PCP,None [Primary Care Provider] - 


Forms:  ED Department Discharge


Care Plan Goals: 


Recheck in 2 to 3 days if not completely back to baseline, an MRI or further 

evaluation by neurology may be necessary.  Take 1 full dose aspirin daily until 

your recheck.





Sepsis Event Note (ED)





- Evaluation


Sepsis Screening Result: No Definite Risk

## 2021-09-18 NOTE — TELEPHONE ENCOUNTER
Pt's wife is calling.  Consent on file.  She is not with him now. He is currently being driven home by friends from [facility].    He woke up this AM with slurred speech, confusion, felt off balance.   Seen in the ED in [place]. No evidence of stroke with CT scan. CT scan was negative per his wife.  Blood work was normal.  They discharged him and advised him to follow up with PCP for possible MRI in a few days.  Given ASA. Now driving back home with friends.  Pt's wife is wanting the on call physician paged and to have them contact the physicians in [place] and his Neurologist at [place] and discuss next steps. She is not wanting to wait to do the MRI and is wanting one ordered now.    6:46pm-Page to Dr Jose Cruz Jara, on call physician.   7:06pm-Call back from Dr Jara. He stated that he called and spoke to the patient's wife.    The patient will go back into the ED here when he is back in [facility], for further follow up and MRI.    Reason for Disposition    [1] Caller requests to speak ONLY to PCP AND [2] urgent question    Additional Information    Negative: Lab calling with strep throat test results and triager can call in prescription    Negative: Lab calling with urinalysis test results and triager can call in prescription    Negative: Medication questions    Negative: ED call to PCP    Negative: Physician call to PCP    Negative: Call about patient who is currently hospitalized    Negative: Lab or radiology calling with CRITICAL test results    Negative: [1] Prescription not at pharmacy AND [2] was prescribed today by PCP    Negative: [1] Follow-up call from patient regarding patient's clinical status AND [2] information urgent    Protocols used: PCP CALL - NO TRIAGE-A-    Mabel Sexton RN  [facility] Nurse Advisor  9/18/2021 at 7:25 PM

## 2021-09-18 NOTE — CRLCT
For Patients:  As a result of the 21st Century Cures Act, medical imaging 

exams and procedure reports are released immediately into your electronic 

medical record.  You may view this report before your referring provider.  

If you have questions, please contact your health care provider.



INDICATION:



Dysarthria and abnormal balance.



TECHNIQUE:



CT Head without contrast.



COMPARISON:



None.



FINDINGS:



CSF spaces: Within normal limits for age.



Brain parenchyma: The gray-white differentiation is normal.  No sign of 

mass, hemorrhage, or midline shift.



Skull base and calvarium: The visualized paranasal sinuses and mastoid air 

cells are clear.  The visualized orbits are grossly unremarkable.  No skull 

fractures.  .



IMPRESSION:



Unremarkable noncontrast head CT.



Please note that all CT scans at this facility use dose modulation, 

iterative reconstruction, and/or weight-based dosing when appropriate to 

reduce radiation dose to as low as reasonably achievable.



Dictated by Sadi Looney MD @ 9/18/2021 5:45:06 PM



(Electronically Signed)

## 2021-09-19 VITALS
RESPIRATION RATE: 16 BRPM | OXYGEN SATURATION: 94 % | SYSTOLIC BLOOD PRESSURE: 106 MMHG | HEIGHT: 74 IN | TEMPERATURE: 97.7 F | HEART RATE: 70 BPM | BODY MASS INDEX: 20.15 KG/M2 | DIASTOLIC BLOOD PRESSURE: 70 MMHG | WEIGHT: 157 LBS

## 2021-09-19 LAB
ALBUMIN UR-MCNC: 20 MG/DL
APPEARANCE UR: CLEAR
ATRIAL RATE - MUSE: 67 BPM
BILIRUB UR QL STRIP: NEGATIVE
COLOR UR AUTO: YELLOW
DIASTOLIC BLOOD PRESSURE - MUSE: NORMAL MMHG
GLUCOSE UR STRIP-MCNC: NEGATIVE MG/DL
HGB UR QL STRIP: NEGATIVE
HYALINE CASTS: 6 /LPF
INTERPRETATION ECG - MUSE: NORMAL
KETONES UR STRIP-MCNC: ABNORMAL MG/DL
LEUKOCYTE ESTERASE UR QL STRIP: NEGATIVE
MUCOUS THREADS #/AREA URNS LPF: PRESENT /LPF
NITRATE UR QL: NEGATIVE
P AXIS - MUSE: 69 DEGREES
PH UR STRIP: 5 [PH] (ref 5–7)
PR INTERVAL - MUSE: 142 MS
QRS DURATION - MUSE: 96 MS
QT - MUSE: 414 MS
QTC - MUSE: 437 MS
R AXIS - MUSE: -1 DEGREES
RBC URINE: 4 /HPF
SP GR UR STRIP: 1.04 (ref 1–1.03)
SYSTOLIC BLOOD PRESSURE - MUSE: NORMAL MMHG
T AXIS - MUSE: 69 DEGREES
UROBILINOGEN UR STRIP-MCNC: NORMAL MG/DL
VENTRICULAR RATE- MUSE: 67 BPM
WBC URINE: 2 /HPF

## 2021-09-19 PROCEDURE — 255N000002 HC RX 255 OP 636: Performed by: EMERGENCY MEDICINE

## 2021-09-19 PROCEDURE — 81001 URINALYSIS AUTO W/SCOPE: CPT | Performed by: EMERGENCY MEDICINE

## 2021-09-19 PROCEDURE — A9585 GADOBUTROL INJECTION: HCPCS | Performed by: EMERGENCY MEDICINE

## 2021-09-19 PROCEDURE — 250N000009 HC RX 250: Performed by: EMERGENCY MEDICINE

## 2021-09-19 RX ADMIN — SODIUM CHLORIDE 50 ML: 900 INJECTION INTRAVENOUS at 00:53

## 2021-09-19 RX ADMIN — GADOBUTROL 10 ML: 604.72 INJECTION INTRAVENOUS at 00:52

## 2021-09-19 NOTE — ED TRIAGE NOTES
[place] had a balance issue and sudden onset confusion. He went to a local hospital and a head CT was done. He was discharged from there but his wife was concerned so they drove home and came here for further evaluation.

## 2021-09-19 NOTE — ED PROVIDER NOTES
History     Chief Complaint:  Altered Mental Status       HPI   Anette Valdes is a 66 year old male who presents with AMS. Patient with AMS. Woke up this morning with confusion, slurred speech, unsteady gait. Almost back to normal now, maybe slight difficulty with ambulation. Denies headache, recent illness.   Was seen in [place] ED that did CT head that was negiatve.     ROS:  Review of Systems   Denies numbness/tingling  Denies fever or illness  +unsteady gait,   Allergies:  No Known Allergies     Medications:    Aricept  losartan     Past Medical History:    Past Medical History:   Diagnosis Date     AAA (abdominal aortic aneurysm) (H) 2012 and 2013     Alzheimer's disease (H)      B12 deficiency 01/01/2016     Dilated aortic root (H) 01/01/2016     Elevated blood sugar      Elevated MCV 01/01/2015     Light headed 12/01/2015     Marfan's syndrome 2020     SBO (small bowel obstruction) (H) 01/01/2007     Splenic artery aneurysm (H) 01/01/2007     Patient Active Problem List   Diagnosis     Splenic artery aneurysm (H)     Elevated MCV     Elevated blood sugar     Dilated aortic root (H)     Abdominal aortic aneurysm without rupture (H)     Vitamin B12 deficiency (non anemic)     Marfan's syndrome     Alzheimer's disease (H)        Past Surgical History:    Past Surgical History:   Procedure Laterality Date     bunion surgery  2008     CATARACT IOL, RT/LT  1994     ENDOVASCULAR REPAIR ANEURYSM ABDOMINAL AORTA  11/12    done at [facility] for aaa     FOOT SURGERY  2000    due to fx     left knee surgery  1998 and 1979     OTHER SURGICAL HISTORY  12/13    done at [facility], open repair of aaa     ROTATOR CUFF REPAIR RT/LT  2001     splenic artery embolization  2007     varocose vein repaired  1970's and 1980     volvulus surgery  2007        Family History:    family history includes Hypertension in his mother.    Social History:   reports that he has never smoked. He has never used smokeless tobacco. He reports current  "alcohol use. He reports that he does not use drugs.  PCP: Dawson Dean     Physical Exam     Patient Vitals for the past 24 hrs:   BP Temp Temp src Pulse Resp SpO2 Height Weight   09/18/21 2255 131/72 97.7  F (36.5  C) Oral 67 16 96 % 1.88 m (6' 2\") 71.2 kg (157 lb)        Physical Exam   General: Sitting up in bed  Eyes:  The pupils are equal and round    Conjunctivae and sclerae are normal  ENT:    Wearing a mask  Neck:  Normal range of motion  CV:  Regular rate     Skin warm and well perfused   Resp:  Non labored breathing on room air    No tachypnea    No cough heard    LUngs clear bilaterally  GI:  Abdomen is soft, there is no rigidity    No distension    No rebound tenderness     No abdominal tenderness  MS:  Normal muscular tone  Skin:  No rash or acute skin lesions noted  Neuro:   Awake, alert.      Finger to nose intact    No arm or leg drift    SILT on bilateral UE/LE    Speech is normal and fluent.    Face is symmetric.     Moves all extremities equally  Psych: Normal affect.  Appropriate interactions.    Emergency Department Course     ECG  ECG taken at 2312, ECG read at 2315  Normal sinus rhythm  Septal infarct, age undetermined  Abnormal ECG   No significant change as compared to prior, dated 11/23/15.  Rate 67 bpm. MN interval 142 ms. QRS duration 96 ms. QT/QTc 414/437 ms. P-R-T axes 69 -1 69.       Imaging:  MRA Angiogram Neck w/o & w Contrast   Final Result   IMPRESSION:   HEAD MRI:    1.  No recent infarct, intracranial mass, abnormal enhancement or evidence of intracranial hemorrhage.   2.  Mild volume loss and presumed chronic small vessel ischemic changes.      HEAD MRA:    1.  Normal MRA [place].      NECK MRA:   1.  Normal neck MRA.      MR Brain w/o & w Contrast   Final Result   IMPRESSION:   HEAD MRI:    1.  No recent infarct, intracranial mass, abnormal enhancement or evidence of intracranial hemorrhage.   2.  Mild volume loss and presumed chronic small vessel ischemic " changes.      HEAD MRA:    1.  Normal MRA [place].      NECK MRA:   1.  Normal neck MRA.      MR Head w/o Contrast Angiogram   Final Result   IMPRESSION:   HEAD MRI:    1.  No recent infarct, intracranial mass, abnormal enhancement or evidence of intracranial hemorrhage.   2.  Mild volume loss and presumed chronic small vessel ischemic changes.      HEAD MRA:    1.  Normal MRA [place].      NECK MRA:   1.  Normal neck MRA.         Laboratory:  Labs Ordered and Resulted from Time of ED Arrival Up to the Time of Departure from the ED   BASIC METABOLIC PANEL - Abnormal; Notable for the following components:       Result Value    Sodium 146 (*)     Chloride 113 (*)     Calcium 8.4 (*)     Glucose 101 (*)     All other components within normal limits   CBC WITH PLATELETS AND DIFFERENTIAL - Abnormal; Notable for the following components:    RBC Count 4.09 (*)     Hemoglobin 13.2 (*)     All other components within normal limits   INR - Normal   PARTIAL THROMBOPLASTIN TIME - Normal   TROPONIN I - Normal   MEASURE WEIGHT   VITAL SIGNS   NEURO CHECKS   CARDIAC CONTINUOUS MONITORING   PULSE OXIMETRY NURSING   PERIPHERAL IV CATHETER   IP DYSPHAGIA SCREEN   ACTIVITY   CBC WITH PLATELETS & DIFFERENTIAL    Narrative:     The following orders were created for panel order CBC with Platelets & Differential.  Procedure                               Abnormality         Status                     ---------                               -----------         ------                     CBC with platelets and d...[[id number]]  Abnormal            Final result                 Please view results for these tests on the individual orders.        Emergency Department Course:  Reviewed:  I reviewed past medical history, vitals, medications    Assessments:   I obtained history and examined the patient as noted above.    I rechecked the patient and wife said he seems normal. Ambulated in ED with normal gait    Consults:    Stroke  neurology    Interventions:  Medications   sodium chloride 0.9% infusion (has no administration in time range)   gadobutrol (GADAVIST) injection 10 mL (has no administration in time range)   sodium chloride 0.9 % CT scan flush use (has no administration in time range)        Disposition:  home    Impression & Plan        Medical Decision Making:  Anette Valdes is a 66 year old male who preesented to the ED with AMS. Patient with AMS earlier. Now does not seem confused per wife. Has a non focal exam. Not a TPA candidate given timing. MRIs obtained that showed no acute findings. Spoke to neurology about patient and imaging  - recommended aspirin 81 mg daily. Recommended TIA outpatient pathway though patient prefers to follow-up with neurology at [place] as he has a neurologist there. He was able to ambulate in ED at baseline and seems to be at baseline. No evidence of infection on history or exam to suggest this as cause.           Diagnosis:    ICD-10-CM    1. Slurred speech  R47.81    2. Unsteady gait  R26.81         9/18/2021   Tamera Mckeon MD Goertz, Maria Kristine, MD  09/19/21 0959

## 2021-09-19 NOTE — TELEPHONE ENCOUNTER
I am on call.            I was asked to call this patient's wife, which I did.   7PM                Her  has been up [facility], with friends.      He awoke this AM with a headache, confused, with speech and balance problems.              They went to an ER in [place]; head CT reported to be negative   and labs reported to be ok.       They gave him an ASA.                 His symptoms have not resolved.         His friends are driving him back to [place]; estimated time of arrival 9 or 9:30.             I advised immediate evaluation at the [facility] ER.           I called the ER with the above information.      His wife will make sure he gets to the ER.

## 2022-01-29 ASSESSMENT — ENCOUNTER SYMPTOMS
ABDOMINAL PAIN: 0
FEVER: 0
SHORTNESS OF BREATH: 0
HEADACHES: 0
HEARTBURN: 0
SORE THROAT: 0
MYALGIAS: 0
NERVOUS/ANXIOUS: 0
PALPITATIONS: 0
HEMATOCHEZIA: 0
ARTHRALGIAS: 0
WEAKNESS: 0
CHILLS: 0
FREQUENCY: 0
PARESTHESIAS: 0
NAUSEA: 0
EYE PAIN: 0
DYSURIA: 0
COUGH: 0
CONSTIPATION: 0
JOINT SWELLING: 0
DIARRHEA: 0
HEMATURIA: 0
DIZZINESS: 0

## 2022-02-03 NOTE — PROGRESS NOTES
SUBJECTIVE:   CC: Anette Valdes is an 66 year old male who presents for preventative health visit.     This is a 66-year-old male here for a physical.    As noted and reviewed the patient has had regular follow-up at the [facility] for his Alzheimer's disease.  He was last seen on January 17 of this year.  That was a televisit.  His memory loss dates back to 2016.  He is felt to have Alzheimer's disease.  He was felt to be stable.    Prior to this the patient was evaluated in the ER in September with confusion, slurred speech and unsteady gait.  During that evaluation he had a normal MRA of the [place] and a normal neck MRA as well as a head MRI that showed no acute events.    At this time the patient notes he is doing well and exercising regularly.  He has no complaints on review of systems.    In reviewing other [facility] notes he has Marfan syndrome and had follow-up with the [facility] cardiology in January 2021 for that.  During that evaluation a CT of his chest, abdomen and pelvis showed a stable aortobiiliac EVAR with an aortic root of 45 mm and a sending aorta 41 mm.  No changes were made at that time.          Healthy Habits:     Getting at least 3 servings of Calcium per day:  Yes    Bi-annual eye exam:  Yes    Dental care twice a year:  Yes    Sleep apnea or symptoms of sleep apnea:  None    Diet:  Regular (no restrictions)    Frequency of exercise:  6-7 days/week    Duration of exercise:  45-60 minutes    Taking medications regularly:  Yes    Medication side effects:  Not applicable    PHQ-2 Total Score: 0    Additional concerns today:  No              Today's PHQ-2 Score:   PHQ-2 ( 1999 [employer]) 1/29/2022   Q1: Little interest or pleasure in doing things 0   Q2: Feeling down, depressed or hopeless 0   PHQ-2 Score 0   PHQ-2 Total Score (12-17 Years)- Positive if 3 or more points; Administer PHQ-A if positive -   Q1: Little interest or pleasure in doing things Not at all   Q2: Feeling down,  depressed or hopeless Not at all   PHQ-2 Score 0       Abuse: Current or Past(Physical, Sexual or Emotional)- No  Do you feel safe in your environment? Yes        Social History     Tobacco Use     Smoking status: Never Smoker     Smokeless tobacco: Never Used   Substance Use Topics     Alcohol use: Yes     Comment: minimal     If you drink alcohol do you typically have >3 drinks per day or >7 drinks per week? No               Past Medical History:      Past Medical History:   Diagnosis Date     AAA (abdominal aortic aneurysm) (H) 2012 and 2013    had endovascular repair 11/12 at [facility].  Had open repair 12/13 at [facility] with post op ileus     Alzheimer's disease (H)     dx [place]     B12 deficiency 01/01/2016    found at [facility] [place], started po and shots     Dilated aortic root (H) 01/01/2016    4.3 on echo, found on est echo done for l.h.     Elevated blood sugar      Elevated MCV 01/01/2015     Light headed 12/01/2015    neg est echo     Marfan's syndrome 2020    dx [place]     SBO (small bowel obstruction) (H) 01/01/2007    due to volvulus, had surgery     Splenic artery aneurysm (H) 01/01/2007    had embolization             Past Surgical History:      Past Surgical History:   Procedure Laterality Date     bunion surgery  2008     CATARACT IOL, RT/LT  1994     ENDOVASCULAR REPAIR ANEURYSM ABDOMINAL AORTA  11/12    done at [facility] for aaa     FOOT SURGERY  2000    due to fx     left knee surgery  1998 and 1979     OTHER SURGICAL HISTORY  12/13    done at [facility], open repair of aaa     ROTATOR CUFF REPAIR RT/LT  2001     splenic artery embolization  2007     varocose vein repaired  1970's and 1980     volvulus surgery  2007             Social History:     Social History     Socioeconomic History     Marital status:      Spouse name: Not on file     Number of children: 2     Years of education: Not on file     Highest education level: Not on file   Occupational History     Not on file   Tobacco Use     Smoking  "status: Never Smoker     Smokeless tobacco: Never Used   Substance and Sexual Activity     Alcohol use: Yes     Comment: minimal     Drug use: No     Sexual activity: Yes     Partners: Female   Other Topics Concern     Not on file   Social History Narrative     Not on file     Social Determinants of Health     Financial Resource Strain: Not on file   Food Insecurity: Not on file   Transportation Needs: Not on file   Physical Activity: Not on file   Stress: Not on file   Social Connections: Not on file   Intimate Partner Violence: Not on file   Housing Stability: Not on file             Family History:   reviewed         Allergies:     Allergies   Allergen Reactions     No Known Allergies              Medications:     Current Outpatient Medications   Medication Sig Dispense Refill     cyanocobalamin (VITAMIN B-12) 500 MCG tablet Take 1 tablet by mouth       donepezil (ARICEPT) 10 MG tablet Take 1 tablet (10 mg) by mouth At Bedtime       losartan (COZAAR) 100 MG tablet Take 50 mg by mouth daily                  Review of Systems:   The 10 point Review of Systems is negative other than noted in the HPI           Physical Exam:   Blood pressure 110/75, pulse 65, temperature 97  F (36.1  C), temperature source Tympanic, resp. rate 16, height 1.88 m (6' 2\"), weight 75.8 kg (167 lb), SpO2 96 %.    Exam:  Constitutional: healthy appearing, alert and in no distress  Heent: Normocephalic. Head without obvious masses or lesions. PERRLDC, EOMI. Mouth exam within normal limits: tongue, mucous membranes, posterior pharynx all normal, no lesions or abnormalities seen.  Tm's and canals within normal limits bilaterally. Neck supple, no nuchal rigidity or masses. No supraclavicular, or cervical adenopathy. Thyroid symmetric, no masses.  Cardiovascular: Regular rate and rhythm, no murmer, rub or gallops.  JVP not elevated, no edema.  Carotids within normal limits bilaterally, no bruits.  Respiratory: Normal respiratory effort.  Lungs " clear, normal flow, no wheezing or crackles.  Breasts: Normal bilaterally.  No masses or lesions.  Nipples within normal limites.  No axillary lesions or nodes.  Gastrointestinal: Normal active bowel sounds.   Soft, not tender, no masses, guarding or rebound.  No hepatosplenomegaly.   Genitourinary: Rectal within normal limits.  Musculoskeletal: extremities normal, no gross deformities noted.  Skin: no suspicious lesions or rashes   Neurologic: Mental status within normal limits.  Speech fluent.  No gross motor abnormalities and gait intact.  Psychiatric: mentation appears normal and affect normal.         Data:   Labs sent        Assessment:   1. Normal complete physical exam  2. alz dz, stable, follow up [place]  3. Aaa, repaired  4. Splenic artery aneurysm, embolized  5. Marfan's syndrome, dilated aorta, stable  6. b12 defic, stable  7. Elevated suga,r mcv, follow up labs  8. hcm           Plan:   Td shot  Exercise  Letter with labs        Dawson Dean M.D.

## 2022-02-04 ENCOUNTER — OFFICE VISIT (OUTPATIENT)
Dept: FAMILY MEDICINE | Facility: CLINIC | Age: 67
End: 2022-02-04
Payer: COMMERCIAL

## 2022-02-04 VITALS
HEART RATE: 65 BPM | OXYGEN SATURATION: 96 % | WEIGHT: 167 LBS | SYSTOLIC BLOOD PRESSURE: 110 MMHG | TEMPERATURE: 97 F | DIASTOLIC BLOOD PRESSURE: 75 MMHG | RESPIRATION RATE: 16 BRPM | BODY MASS INDEX: 21.43 KG/M2 | HEIGHT: 74 IN

## 2022-02-04 DIAGNOSIS — E53.8 VITAMIN B12 DEFICIENCY (NON ANEMIC): ICD-10-CM

## 2022-02-04 DIAGNOSIS — Q87.40 MARFAN'S SYNDROME: ICD-10-CM

## 2022-02-04 DIAGNOSIS — I72.8 SPLENIC ARTERY ANEURYSM (H): ICD-10-CM

## 2022-02-04 DIAGNOSIS — F02.80 ALZHEIMER'S DISEASE (H): ICD-10-CM

## 2022-02-04 DIAGNOSIS — Z00.00 ENCOUNTER FOR ANNUAL PHYSICAL EXAM: ICD-10-CM

## 2022-02-04 DIAGNOSIS — Z23 NEED FOR VACCINATION: Primary | ICD-10-CM

## 2022-02-04 DIAGNOSIS — R73.9 ELEVATED BLOOD SUGAR: ICD-10-CM

## 2022-02-04 DIAGNOSIS — G30.8 OTHER ALZHEIMER'S DISEASE (CODE) (H): ICD-10-CM

## 2022-02-04 DIAGNOSIS — I71.40 ABDOMINAL AORTIC ANEURYSM WITHOUT RUPTURE (H): ICD-10-CM

## 2022-02-04 DIAGNOSIS — R71.8 ELEVATED MCV: ICD-10-CM

## 2022-02-04 DIAGNOSIS — G30.9 ALZHEIMER'S DISEASE (H): ICD-10-CM

## 2022-02-04 DIAGNOSIS — I77.810 DILATED AORTIC ROOT (H): ICD-10-CM

## 2022-02-04 LAB
ERYTHROCYTE [DISTWIDTH] IN BLOOD BY AUTOMATED COUNT: 12.8 % (ref 10–15)
HCT VFR BLD AUTO: 46.1 % (ref 40–53)
HGB BLD-MCNC: 15.4 G/DL (ref 13.3–17.7)
MCH RBC QN AUTO: 33 PG (ref 26.5–33)
MCHC RBC AUTO-ENTMCNC: 33.4 G/DL (ref 31.5–36.5)
MCV RBC AUTO: 99 FL (ref 78–100)
PLATELET # BLD AUTO: 207 10E3/UL (ref 150–450)
RBC # BLD AUTO: 4.66 10E6/UL (ref 4.4–5.9)
WBC # BLD AUTO: 5.6 10E3/UL (ref 4–11)

## 2022-02-04 PROCEDURE — 80061 LIPID PANEL: CPT | Performed by: INTERNAL MEDICINE

## 2022-02-04 PROCEDURE — 90471 IMMUNIZATION ADMIN: CPT | Performed by: INTERNAL MEDICINE

## 2022-02-04 PROCEDURE — 80053 COMPREHEN METABOLIC PANEL: CPT | Performed by: INTERNAL MEDICINE

## 2022-02-04 PROCEDURE — 85027 COMPLETE CBC AUTOMATED: CPT | Performed by: INTERNAL MEDICINE

## 2022-02-04 PROCEDURE — 90714 TD VACC NO PRESV 7 YRS+ IM: CPT | Performed by: INTERNAL MEDICINE

## 2022-02-04 PROCEDURE — 36415 COLL VENOUS BLD VENIPUNCTURE: CPT | Performed by: INTERNAL MEDICINE

## 2022-02-04 PROCEDURE — 99396 PREV VISIT EST AGE 40-64: CPT | Mod: 25 | Performed by: INTERNAL MEDICINE

## 2022-02-04 RX ORDER — DONEPEZIL HYDROCHLORIDE 10 MG/1
10 TABLET, FILM COATED ORAL AT BEDTIME
Start: 2022-02-04

## 2022-02-04 ASSESSMENT — MIFFLIN-ST. JEOR: SCORE: 1617.26

## 2022-02-04 ASSESSMENT — PAIN SCALES - GENERAL: PAINLEVEL: NO PAIN (0)

## 2022-02-04 NOTE — LETTER
February 7, 2022      Anette CED [address]ritajos[facility]  [address] [place] [address]        Dear ,    We are writing to inform you of your test results.    I am happy to report that your cbc or complete blood count is normal with no signs of anemia, leukemia or platelet abnormalities.  Your chemistry panel shows no signs of diabetes.  Your blood salts, kidney tests, liver tests, and proteins are all fine.     Your total cholesterol is 179 with the normal range being below 200.  Your HDL or good cholesterol is 54 with the normal range being above 40.  Your LDL or bad cholesterol is 102 with the normal range being below 130.  These numbers are very good.       I am happy to bring you this overall excellent report.       Resulted Orders   CBC with platelets   Result Value Ref Range    WBC Count 5.6 4.0 - 11.0 10e3/uL    RBC Count 4.66 4.40 - 5.90 10e6/uL    Hemoglobin 15.4 13.3 - 17.7 g/dL    Hematocrit 46.1 40.0 - 53.0 %    MCV 99 78 - 100 fL    MCH 33.0 26.5 - 33.0 pg    MCHC 33.4 31.5 - 36.5 g/dL    RDW 12.8 10.0 - 15.0 %    Platelet Count 207 150 - 450 10e3/uL   Lipid panel reflex to direct LDL Fasting   Result Value Ref Range    Cholesterol 179 <200 mg/dL    Triglycerides 148 <150 mg/dL    Direct Measure HDL 58 >=40 mg/dL    LDL Cholesterol Calculated 91 <=100 mg/dL    Non HDL Cholesterol 121 <130 mg/dL    Patient Fasting > 8hrs? No     Narrative    Cholesterol  Desirable:  <200 mg/dL    Triglycerides  Normal:  Less than 150 mg/dL  Borderline High:  150-199 mg/dL  High:  200-499 mg/dL  Very High:  Greater than or equal to 500 mg/dL    Direct Measure HDL  Female:  Greater than or equal to 50 mg/dL   Male:  Greater than or equal to 40 mg/dL    LDL Cholesterol  Desirable:  <100mg/dL  Above Desirable:  100-129 mg/dL   Borderline High:  130-159 mg/dL   High:  160-189 mg/dL   Very High:  >= 190 mg/dL    Non HDL Cholesterol  Desirable:  130 mg/dL  Above Desirable:  130-159 mg/dL  Borderline High:  160-189 mg/dL  High:   190-219 mg/dL  Very High:  Greater than or equal to 220 mg/dL   Comprehensive metabolic panel   Result Value Ref Range    Sodium 138 133 - 144 mmol/L    Potassium 4.4 3.4 - 5.3 mmol/L    Chloride 104 94 - 109 mmol/L    Carbon Dioxide (CO2) 28 20 - 32 mmol/L    Anion Gap 6 3 - 14 mmol/L    Urea Nitrogen 26 7 - 30 mg/dL    Creatinine 1.04 0.66 - 1.25 mg/dL    Calcium 9.2 8.5 - 10.1 mg/dL    Glucose 72 70 - 99 mg/dL    Alkaline Phosphatase 64 40 - 150 U/L    AST 21 0 - 45 U/L    ALT 28 0 - 70 U/L    Protein Total 7.1 6.8 - 8.8 g/dL    Albumin 3.9 3.4 - 5.0 g/dL    Bilirubin Total 0.6 0.2 - 1.3 mg/dL    GFR Estimate 80 >60 mL/min/1.73m2      Comment:      Effective December 21, 2021 eGFRcr in adults is calculated using the 2021 CKD-EPI creatinine equation which includes age and gender (Hood et al., NEJM, DOI: 10.1056/XKWUrk5791745)       If you have any questions or concerns, please call the clinic at the number listed above.       Sincerely,      Dawson Dean MD

## 2022-02-04 NOTE — NURSING NOTE
Prior to immunization administration, verified patients identity using patient s name and date of birth. Please see Immunization Activity for additional information.     Screening Questionnaire for Adult Immunization    Are you sick today?   No   Do you have allergies to medications, food, a vaccine component or latex?   No   Have you ever had a serious reaction after receiving a vaccination?   No   Do you have a long-term health problem with heart, lung, kidney, or metabolic disease (e.g., diabetes), asthma, a blood disorder, no spleen, complement component deficiency, a cochlear implant, or a spinal fluid leak?  Are you on long-term aspirin therapy?   No   Do you have cancer, leukemia, HIV/AIDS, or any other immune system problem?   No   Do you have a parent, brother, or sister with an immune system problem?   No   In the past 3 months, have you taken medications that affect  your immune system, such as prednisone, other steroids, or anticancer drugs; drugs for the treatment of rheumatoid arthritis, Crohn s disease, or psoriasis; or have you had radiation treatments?   No   Have you had a seizure, or a brain or other nervous system problem?   No   During the past year, have you received a transfusion of blood or blood    products, or been given immune (gamma) globulin or antiviral drug?   No   For women: Are you pregnant or is there a chance you could become       pregnant during the next month?   No   Have you received any vaccinations in the past 4 weeks?   No     Immunization questionnaire answers were all negative.        Per orders of Dr. Dean, injection of TD given by Lolita Sibley CMA. Patient instructed to remain in clinic for 15 minutes afterwards, and to report any adverse reaction to me immediately.       Screening performed by Lolita Sibley CMA on 2/4/2022 at 2:33 PM.

## 2022-02-05 LAB
ALBUMIN SERPL-MCNC: 3.9 G/DL (ref 3.4–5)
ALP SERPL-CCNC: 64 U/L (ref 40–150)
ALT SERPL W P-5'-P-CCNC: 28 U/L (ref 0–70)
ANION GAP SERPL CALCULATED.3IONS-SCNC: 6 MMOL/L (ref 3–14)
AST SERPL W P-5'-P-CCNC: 21 U/L (ref 0–45)
BILIRUB SERPL-MCNC: 0.6 MG/DL (ref 0.2–1.3)
BUN SERPL-MCNC: 26 MG/DL (ref 7–30)
CALCIUM SERPL-MCNC: 9.2 MG/DL (ref 8.5–10.1)
CHLORIDE BLD-SCNC: 104 MMOL/L (ref 94–109)
CHOLEST SERPL-MCNC: 179 MG/DL
CO2 SERPL-SCNC: 28 MMOL/L (ref 20–32)
CREAT SERPL-MCNC: 1.04 MG/DL (ref 0.66–1.25)
FASTING STATUS PATIENT QL REPORTED: NO
GFR SERPL CREATININE-BSD FRML MDRD: 80 ML/MIN/1.73M2
GLUCOSE BLD-MCNC: 72 MG/DL (ref 70–99)
HDLC SERPL-MCNC: 58 MG/DL
LDLC SERPL CALC-MCNC: 91 MG/DL
NONHDLC SERPL-MCNC: 121 MG/DL
POTASSIUM BLD-SCNC: 4.4 MMOL/L (ref 3.4–5.3)
PROT SERPL-MCNC: 7.1 G/DL (ref 6.8–8.8)
SODIUM SERPL-SCNC: 138 MMOL/L (ref 133–144)
TRIGL SERPL-MCNC: 148 MG/DL

## 2022-02-05 NOTE — RESULT ENCOUNTER NOTE
It was a pleasure seeing you for your physical examination.  I wanted to get back to you with your test results.  I have enclosed a copy for your review.      I am happy to report that your cbc or complete blood count is normal with no signs of anemia, leukemia or platelet abnormalities.  Your chemistry panel shows no signs of diabetes.  Your blood salts, kidney tests, liver tests, and proteins are all fine.    Your total cholesterol is 179 with the normal range being below 200.  Your HDL or good cholesterol is 54 with the normal range being above 40.  Your LDL or bad cholesterol is 102 with the normal range being below 130.  These numbers are very good.      I am happy to bring you this overall excellent report.  If you have any questions please call me.    Dawson Dean M.D.

## 2022-10-16 ENCOUNTER — HEALTH MAINTENANCE LETTER (OUTPATIENT)
Age: 67
End: 2022-10-16

## 2023-02-23 ENCOUNTER — LAB (OUTPATIENT)
Dept: LAB | Facility: CLINIC | Age: 68
End: 2023-02-23
Payer: COMMERCIAL

## 2023-02-23 ENCOUNTER — TELEPHONE (OUTPATIENT)
Dept: FAMILY MEDICINE | Facility: CLINIC | Age: 68
End: 2023-02-23

## 2023-02-23 DIAGNOSIS — E53.8 VITAMIN B12 DEFICIENCY (NON ANEMIC): ICD-10-CM

## 2023-02-23 DIAGNOSIS — Z00.00 MEDICARE ANNUAL WELLNESS VISIT, SUBSEQUENT: ICD-10-CM

## 2023-02-23 DIAGNOSIS — R73.9 ELEVATED BLOOD SUGAR: ICD-10-CM

## 2023-02-23 DIAGNOSIS — Z00.00 MEDICARE ANNUAL WELLNESS VISIT, SUBSEQUENT: Primary | ICD-10-CM

## 2023-02-23 LAB
ALBUMIN SERPL BCG-MCNC: 4.4 G/DL (ref 3.5–5.2)
ALP SERPL-CCNC: 59 U/L (ref 40–129)
ALT SERPL W P-5'-P-CCNC: 15 U/L (ref 10–50)
ANION GAP SERPL CALCULATED.3IONS-SCNC: 10 MMOL/L (ref 7–15)
AST SERPL W P-5'-P-CCNC: 31 U/L (ref 10–50)
BILIRUB SERPL-MCNC: 0.7 MG/DL
BUN SERPL-MCNC: 23.4 MG/DL (ref 8–23)
CALCIUM SERPL-MCNC: 9.9 MG/DL (ref 8.8–10.2)
CHLORIDE SERPL-SCNC: 103 MMOL/L (ref 98–107)
CHOLEST SERPL-MCNC: 195 MG/DL
CREAT SERPL-MCNC: 1.07 MG/DL (ref 0.67–1.17)
DEPRECATED HCO3 PLAS-SCNC: 26 MMOL/L (ref 22–29)
ERYTHROCYTE [DISTWIDTH] IN BLOOD BY AUTOMATED COUNT: 12.2 % (ref 10–15)
GFR SERPL CREATININE-BSD FRML MDRD: 77 ML/MIN/1.73M2
GLUCOSE SERPL-MCNC: 94 MG/DL (ref 70–99)
HBA1C MFR BLD: 5.3 % (ref 0–5.6)
HCT VFR BLD AUTO: 45.1 % (ref 40–53)
HDLC SERPL-MCNC: 64 MG/DL
HGB BLD-MCNC: 14.4 G/DL (ref 13.3–17.7)
LDLC SERPL CALC-MCNC: 116 MG/DL
MCH RBC QN AUTO: 32.9 PG (ref 26.5–33)
MCHC RBC AUTO-ENTMCNC: 31.9 G/DL (ref 31.5–36.5)
MCV RBC AUTO: 103 FL (ref 78–100)
NONHDLC SERPL-MCNC: 131 MG/DL
PLATELET # BLD AUTO: 167 10E3/UL (ref 150–450)
POTASSIUM SERPL-SCNC: 4.7 MMOL/L (ref 3.4–5.3)
PROT SERPL-MCNC: 6.9 G/DL (ref 6.4–8.3)
RBC # BLD AUTO: 4.38 10E6/UL (ref 4.4–5.9)
SODIUM SERPL-SCNC: 139 MMOL/L (ref 136–145)
TRIGL SERPL-MCNC: 76 MG/DL
VIT B12 SERPL-MCNC: 2412 PG/ML (ref 232–1245)
WBC # BLD AUTO: 5 10E3/UL (ref 4–11)

## 2023-02-23 PROCEDURE — 85027 COMPLETE CBC AUTOMATED: CPT

## 2023-02-23 PROCEDURE — 82607 VITAMIN B-12: CPT

## 2023-02-23 PROCEDURE — 36415 COLL VENOUS BLD VENIPUNCTURE: CPT

## 2023-02-23 PROCEDURE — 80061 LIPID PANEL: CPT

## 2023-02-23 PROCEDURE — 83036 HEMOGLOBIN GLYCOSYLATED A1C: CPT

## 2023-02-23 PROCEDURE — 80053 COMPREHEN METABOLIC PANEL: CPT

## 2023-02-26 ASSESSMENT — ENCOUNTER SYMPTOMS
HEARTBURN: 0
CONSTIPATION: 0
PALPITATIONS: 0
FREQUENCY: 0
SORE THROAT: 0
DYSURIA: 0
SHORTNESS OF BREATH: 0
DIZZINESS: 0
ARTHRALGIAS: 0
DIARRHEA: 0
CHILLS: 0
COUGH: 0
NAUSEA: 0
WEAKNESS: 0
HEADACHES: 0
MYALGIAS: 0
PARESTHESIAS: 0
NERVOUS/ANXIOUS: 0
ABDOMINAL PAIN: 0
HEMATOCHEZIA: 0
FEVER: 0
HEMATURIA: 0
EYE PAIN: 0

## 2023-02-26 ASSESSMENT — ACTIVITIES OF DAILY LIVING (ADL): CURRENT_FUNCTION: NO ASSISTANCE NEEDED

## 2023-02-27 ENCOUNTER — OFFICE VISIT (OUTPATIENT)
Dept: FAMILY MEDICINE | Facility: CLINIC | Age: 68
End: 2023-02-27
Payer: COMMERCIAL

## 2023-02-27 VITALS
DIASTOLIC BLOOD PRESSURE: 74 MMHG | WEIGHT: 172.5 LBS | BODY MASS INDEX: 22.14 KG/M2 | HEIGHT: 74 IN | HEART RATE: 53 BPM | OXYGEN SATURATION: 100 % | TEMPERATURE: 97.2 F | SYSTOLIC BLOOD PRESSURE: 110 MMHG

## 2023-02-27 DIAGNOSIS — Z12.11 SCREEN FOR COLON CANCER: ICD-10-CM

## 2023-02-27 DIAGNOSIS — Z00.00 ENCOUNTER FOR PREVENTIVE CARE: Primary | ICD-10-CM

## 2023-02-27 DIAGNOSIS — I10 PRIMARY HYPERTENSION: ICD-10-CM

## 2023-02-27 DIAGNOSIS — F02.80 ALZHEIMER'S DISEASE (H): ICD-10-CM

## 2023-02-27 DIAGNOSIS — Z23 NEED FOR VACCINATION: ICD-10-CM

## 2023-02-27 DIAGNOSIS — G30.9 ALZHEIMER'S DISEASE (H): ICD-10-CM

## 2023-02-27 DIAGNOSIS — Z12.5 SCREENING FOR PROSTATE CANCER: ICD-10-CM

## 2023-02-27 PROCEDURE — 90677 PCV20 VACCINE IM: CPT | Performed by: INTERNAL MEDICINE

## 2023-02-27 PROCEDURE — 99397 PER PM REEVAL EST PAT 65+ YR: CPT | Mod: 25 | Performed by: INTERNAL MEDICINE

## 2023-02-27 PROCEDURE — 90471 IMMUNIZATION ADMIN: CPT | Performed by: INTERNAL MEDICINE

## 2023-02-27 PROCEDURE — 36415 COLL VENOUS BLD VENIPUNCTURE: CPT | Performed by: INTERNAL MEDICINE

## 2023-02-27 PROCEDURE — 99213 OFFICE O/P EST LOW 20 MIN: CPT | Mod: 25 | Performed by: INTERNAL MEDICINE

## 2023-02-27 PROCEDURE — G0103 PSA SCREENING: HCPCS | Performed by: INTERNAL MEDICINE

## 2023-02-27 ASSESSMENT — ENCOUNTER SYMPTOMS
CHILLS: 0
NERVOUS/ANXIOUS: 0
SHORTNESS OF BREATH: 0
ARTHRALGIAS: 0
WEAKNESS: 0
CONSTIPATION: 0
HEADACHES: 0
PARESTHESIAS: 0
DIARRHEA: 0
SORE THROAT: 0
PALPITATIONS: 0
FEVER: 0
NAUSEA: 0
HEMATURIA: 0
DIZZINESS: 0
EYE PAIN: 0
DYSURIA: 0
FREQUENCY: 0
ABDOMINAL PAIN: 0
COUGH: 0
HEARTBURN: 0
MYALGIAS: 0
HEMATOCHEZIA: 0

## 2023-02-27 ASSESSMENT — PAIN SCALES - GENERAL: PAINLEVEL: NO PAIN (0)

## 2023-02-27 ASSESSMENT — ACTIVITIES OF DAILY LIVING (ADL): CURRENT_FUNCTION: NO ASSISTANCE NEEDED

## 2023-02-27 NOTE — PROGRESS NOTES
Prior to immunization administration, verified patients identity using patient s name and date of birth. Please see Immunization Activity for additional information.     Screening Questionnaire for Adult Immunization    Are you sick today?   No   Do you have allergies to medications, food, a vaccine component or latex?   No   Have you ever had a serious reaction after receiving a vaccination?   No   Do you have a long-term health problem with heart, lung, kidney, or metabolic disease (e.g., diabetes), asthma, a blood disorder, no spleen, complement component deficiency, a cochlear implant, or a spinal fluid leak?  Are you on long-term aspirin therapy?   No   Do you have cancer, leukemia, HIV/AIDS, or any other immune system problem?   No   Do you have a parent, brother, or sister with an immune system problem?   No   In the past 3 months, have you taken medications that affect  your immune system, such as prednisone, other steroids, or anticancer drugs; drugs for the treatment of rheumatoid arthritis, Crohn s disease, or psoriasis; or have you had radiation treatments?   No   Have you had a seizure, or a brain or other nervous system problem?   No   During the past year, have you received a transfusion of blood or blood    products, or been given immune (gamma) globulin or antiviral drug?   No   For women: Are you pregnant or is there a chance you could become       pregnant during the next month?   No   Have you received any vaccinations in the past 4 weeks?   No     Immunization questionnaire answers were all negative.        Per orders of Dr. Langley, injection of Prevnar 20  given by Adry Bolton CMA. Patient instructed to remain in clinic for 15 minutes afterwards, and to report any adverse reaction to me immediately.       Screening performed by Adry Bolton CMA on 2/27/2023 at 4:41 PM.

## 2023-02-27 NOTE — PROGRESS NOTES
"SUBJECTIVE:   Anette is a 67 year old who presents for Preventive Visit.  Patient has been advised of split billing requirements and indicates understanding: Yes  Are you in the first 12 months of your Medicare coverage?  No    Healthy Habits:     In general, how would you rate your overall health?  Excellent    Frequency of exercise:  4-5 days/week    Duration of exercise:  30-45 minutes    Do you usually eat at least 4 servings of fruit and vegetables a day, include whole grains    & fiber and avoid regularly eating high fat or \"junk\" foods?  Yes    Taking medications regularly:  Yes    Barriers to taking medications:  None    Medication side effects:  None    Ability to successfully perform activities of daily living:  No assistance needed    Home Safety:  Throw rugs in the hallway and lack of grab bars in the bathroom    Hearing Impairment:  Difficulty understanding soft or whispered speech    In the past 6 months, have you been bothered by leaking of urine?  No    In general, how would you rate your overall mental or emotional health?  Good      PHQ-2 Total Score: 0    Additional concerns today:  No      Have you ever done Advance Care Planning? (For example, a Health Directive, POLST, or a discussion with a medical provider or your loved ones about your wishes): No, advance care planning information given to patient to review.  Patient declined advance care planning discussion at this time.    Fall risk  Fallen 2 or more times in the past year?: No  Any fall with injury in the past year?: No  click delete button to remove this line now  Cognitive Screening   1) Repeat 3 items (Leader, Season, Table)    2) Clock draw: NORMAL  3) 3 item recall: Recalls 3 objects  Results: NORMAL clock, 1-2 items recalled: COGNITIVE IMPAIRMENT LESS LIKELY    Mini-CogTM Copyright NEIL Campbell. Licensed by the author for use in [facility]; reprinted with permission (lindsey@.[facility]). All rights reserved.      Do you have " sleep apnea, excessive snoring or daytime drowsiness?: no    Reviewed and updated as needed this visit by clinical staff   Tobacco  Allergies  Meds      Soc Hx        Reviewed and updated as needed this visit by Provider                 Social History     Tobacco Use     Smoking status: Never     Smokeless tobacco: Never   Substance Use Topics     Alcohol use: Yes     Comment: minimal     If you drink alcohol do you typically have >3 drinks per day or >7 drinks per week? No    Alcohol Use 2/27/2023   Prescreen: >3 drinks/day or >7 drinks/week? -   Prescreen: >3 drinks/day or >7 drinks/week? No     Current providers sharing in care for this patient include:   Patient Care Team:  Dawson Dean MD as PCP - General (Internal Medicine)  Dawson Dean MD as Assigned PCP    The following health maintenance items are reviewed in Epic and correct as of today:  Health Maintenance   Topic Date Due     ANNUAL REVIEW OF  ORDERS  Never done     COLORECTAL CANCER SCREENING  Never done     Pneumococcal Vaccine: 65+ Years (1 - PCV) Never done     MEDICARE ANNUAL WELLNESS VISIT  02/04/2023     FALL RISK ASSESSMENT  02/27/2024     LIPID  02/23/2028     ADVANCE CARE PLANNING  02/27/2028     DTAP/TDAP/TD IMMUNIZATION (3 - Td or Tdap) 02/04/2032     HEPATITIS C SCREENING  Completed     HIV SCREENING  Completed     PHQ-2 (once per calendar year)  Completed     INFLUENZA VACCINE  Completed     ZOSTER IMMUNIZATION  Completed     AORTIC ANEURYSM SCREENING (SYSTEM ASSIGNED)  Completed     COVID-19 Vaccine  Completed     IPV IMMUNIZATION  Aged Out     MENINGITIS IMMUNIZATION  Aged Out               Review of Systems   Constitutional: Negative for chills and fever.   HENT: Negative for congestion, ear pain, hearing loss and sore throat.    Eyes: Negative for pain and visual disturbance.   Respiratory: Negative for cough and shortness of breath.    Cardiovascular: Negative for chest pain, palpitations and peripheral edema.  "  Gastrointestinal: Negative for abdominal pain, constipation, diarrhea, heartburn, hematochezia and nausea.   Genitourinary: Negative for dysuria, frequency, genital sores, hematuria, impotence, penile discharge and urgency.   Musculoskeletal: Negative for arthralgias and myalgias.   Skin: Negative for rash.   Neurological: Negative for dizziness, weakness, headaches and paresthesias.   Psychiatric/Behavioral: Negative for mood changes. The patient is not nervous/anxious.          OBJECTIVE:   /74 (BP Location: Right arm, Patient Position: Sitting, Cuff Size: Adult Regular)   Pulse 53   Temp 97.2  F (36.2  C) (Tympanic)   Ht 1.88 m (6' 2\")   Wt 78.2 kg (172 lb 8 oz)   SpO2 100%   BMI 22.15 kg/m   Estimated body mass index is 22.15 kg/m  as calculated from the following:    Height as of this encounter: 1.88 m (6' 2\").    Weight as of this encounter: 78.2 kg (172 lb 8 oz).  Physical Exam  GENERAL: healthy, alert and no distress  EYES: Eyes grossly normal to inspection, PERRL and conjunctivae and sclerae normal  HENT: ear canals and TM's normal, nose and mouth without ulcers or lesions  NECK: no adenopathy, no asymmetry, masses, or scars and thyroid normal to palpation  RESP: lungs clear to auscultation - no rales, rhonchi or wheezes  CV: regular rate and rhythm, normal S1 S2, no S3 or S4, no murmur, click or rub, no peripheral edema and peripheral pulses strong  ABDOMEN: soft, nontender, no hepatosplenomegaly, no masses and bowel sounds normal  MS: no gross musculoskeletal defects noted, no edema  SKIN: no suspicious lesions or rashes  NEURO: Normal strength and tone, mentation intact and speech normal  PSYCH: mentation appears normal, affect normal/bright        ASSESSMENT / PLAN:       ICD-10-CM    1. Encounter for preventive care   Age and gender appropriate preventive care and screenings are discussed.  Particular attention to personal preventive care and age appropriate lifestyle including the " incorporation of healthy diet and physical activity is made.     Z00.00       2. Primary hypertension   stable I10       3. Alzheimer's disease (H)   Sees Souris twice a year.  I did speak with his wife separately RE plan of care.   G30.9     F02.80       4. Screen for colon cancer     Pt declines, siting preference and other life limiting disease process Z12.11       5. Screening for prostate cancer   In contrast to above, wife reports patient's brother (2 years younger) Dx with prostate CA.  Will add PSA to recent labs.   Z12.5 PSA, screen     PSA, screen      6. Need for vaccination  Z23 Pneumococcal 20 Valent Conjugate (Prevnar 20)              He reports that he has never smoked. He has never used smokeless tobacco.      Appropriate preventive services were discussed with this patient, including applicable screening as appropriate for cardiovascular disease, diabetes, osteopenia/osteoporosis, and glaucoma.  As appropriate for age/gender, discussed screening for colorectal cancer, prostate cancer, breast cancer, and cervical cancer. Checklist reviewing preventive services available has been given to the patient.    Reviewed patients plan of care and provided an AVS. The Basic Care Plan (routine screening as documented in Health Maintenance) for Anette meets the Care Plan requirement. This Care Plan has been established and reviewed with the Patient.      Benjy Langley MD  [facility]    Identified Health Risks:

## 2023-02-28 LAB — PSA SERPL-MCNC: 0.85 NG/ML (ref 0–4.5)

## 2024-06-01 ENCOUNTER — HEALTH MAINTENANCE LETTER (OUTPATIENT)
Age: 69
End: 2024-06-01

## 2025-04-25 ENCOUNTER — OFFICE VISIT (OUTPATIENT)
Dept: FAMILY MEDICINE | Facility: CLINIC | Age: 70
End: 2025-04-25
Payer: COMMERCIAL

## 2025-04-25 VITALS
SYSTOLIC BLOOD PRESSURE: 106 MMHG | BODY MASS INDEX: 23.61 KG/M2 | DIASTOLIC BLOOD PRESSURE: 65 MMHG | RESPIRATION RATE: 16 BRPM | TEMPERATURE: 97.8 F | OXYGEN SATURATION: 97 % | WEIGHT: 184 LBS | HEIGHT: 74 IN | HEART RATE: 52 BPM

## 2025-04-25 DIAGNOSIS — L98.9 LEG SKIN LESION, RIGHT: Primary | ICD-10-CM

## 2025-04-25 PROCEDURE — 3074F SYST BP LT 130 MM HG: CPT | Performed by: INTERNAL MEDICINE

## 2025-04-25 PROCEDURE — 1126F AMNT PAIN NOTED NONE PRSNT: CPT | Performed by: INTERNAL MEDICINE

## 2025-04-25 PROCEDURE — 3078F DIAST BP <80 MM HG: CPT | Performed by: INTERNAL MEDICINE

## 2025-04-25 PROCEDURE — G2211 COMPLEX E/M VISIT ADD ON: HCPCS | Performed by: INTERNAL MEDICINE

## 2025-04-25 PROCEDURE — 99212 OFFICE O/P EST SF 10 MIN: CPT | Performed by: INTERNAL MEDICINE

## 2025-04-25 ASSESSMENT — PAIN SCALES - GENERAL: PAINLEVEL_OUTOF10: NO PAIN (0)

## 2025-04-25 NOTE — PROGRESS NOTES
Assessment & Plan     Leg skin lesion, right  - symptoms not well controlled  - Adult Dermatology  Referral where his consultation appointment has been scheduled for 5/5/2025.                Follow-up in 1 month      Salvador Cheema is a 69 year old, presenting for the following health issues:  Leg Problem    History of Present Illness       Reason for visit:  Leg wound not healing   He is taking medications regularly.        Current Outpatient Medications   Medication Sig Dispense Refill    cyanocobalamin (VITAMIN B-12) 500 MCG tablet Take 1 tablet by mouth      donepezil (ARICEPT) 10 MG tablet Take 1 tablet (10 mg) by mouth At Bedtime      losartan (COZAAR) 100 MG tablet Take 50 mg by mouth daily        No current facility-administered medications for this visit.     Past Medical History:   Diagnosis Date    AAA (abdominal aortic aneurysm) 2012 and 2013    had endovascular repair 11/12 at [facility].  Had open repair 12/13 at [facility] with post op ileus    Alzheimer's disease (H)     dx [place]    B12 deficiency 01/01/2016    found at [facility] [place], started po and shots    Dilated aortic root 01/01/2016    4.3 on echo, found on est echo done for l.h. stable by fu [place] 1/21    Elevated blood sugar     Elevated MCV 01/01/2015    Light headed 12/01/2015    neg est echo    Marfan's syndrome 2020    dx [place]    SBO (small bowel obstruction) (H) 01/01/2007    due to volvulus, had surgery    Splenic artery aneurysm 01/01/2007    had embolization     Social History     Socioeconomic History    Marital status:      Spouse name: Not on file    Number of children: 2    Years of education: Not on file    Highest education level: Not on file   Occupational History    Not on file   Tobacco Use    Smoking status: Never    Smokeless tobacco: Never   Vaping Use    Vaping status: Never Used   Substance and Sexual Activity    Alcohol use: Yes     Comment: minimal    Drug use: No    Sexual activity: Yes     Partners:  Female   Other Topics Concern    Parent/sibling w/ CABG, MI or angioplasty before 65F 55M? No   Social History Narrative    Not on file     Social Drivers of Health     Financial Resource Strain: Low Risk  (9/13/2023)    Received from [facility]    Overall Financial Resource Strain (CARDIA)     Difficulty of Paying Living Expenses: Not hard at all   Food Insecurity: No Food Insecurity (9/29/2024)    Received from [facility]    Hunger Vital Sign     Worried About Running Out of Food in the Last Year: Never true     Ran Out of Food in the Last Year: Never true   Transportation Needs: No Transportation Needs (9/29/2024)    Received from [facility]    PRAPARE - Transportation     Lack of Transportation (Medical): No     Lack of Transportation (Non-Medical): No   Physical Activity: Sufficiently Active (9/29/2024)    Received from [facility]    Exercise Vital Sign     Days of Exercise per Week: 7 days     Minutes of Exercise per Session: 40 min   Stress: No Stress Concern Present (7/23/2022)    Received from [facility]    [employer] [place] [employer] - Occupational Stress Questionnaire     Feeling of Stress : Not at all   Social Connections: Moderately Isolated (7/23/2022)    Received from [facility], [facility]    Social Connection and Isolation Panel [NHANES]     Frequency of Communication with Friends and Family: Twice a week     Frequency of Social Gatherings with Friends and Family: Once a week     Attends Roman [employer] Services: Never     Active Member of Clubs or Organizations: No     Attends Club or Organization Meetings: Never     Marital Status:    Interpersonal Safety: Not At Risk (8/30/2023)    Received from [facility]    Humiliation, Afraid, Rape, and Kick questionnaire     Fear of Current or Ex-Partner: No     Emotionally Abused: No     Physically Abused: No     Sexually Abused: No   Housing Stability: Low Risk  (9/29/2024)    Received from [facility]    Housing Stability     What is your living situation today?: I have a steady place to live         Review of Systems  Constitutional, HEENT, cardiovascular, pulmonary, GI, , musculoskeletal, neuro, skin, endocrine and psych systems are negative, except as otherwise noted.      Objective    /65 (BP Location: Right arm, Patient Position: Sitting, Cuff Size: Adult Regular)   Pulse 52   Temp 97.8  F (36.6  C) (Temporal)   Resp 16   Ht 1.88 m (6' 2\")   Wt 83.5 kg (184 lb)   SpO2 97%   BMI 23.62 kg/m    Body mass index is 23.62 kg/m .  Physical Exam   GENERAL: alert and no distress  EYES: Eyes grossly normal to inspection, conjunctivae and sclerae normal  HENT: normocephalic atraumatic  NECK: no asymmetry  RESP: lungs clear to auscultation - no rales, rhonchi or wheezes  CV: regular rate and rhythm, normal S1 S2  ABDOMEN: nondistended  MS: no gross musculoskeletal defects noted, no edema  SKIN: right lower leg skin lesion present  NEURO: Normal strength and tone, speech normal  PSYCH: flat affect    Labs reviewed in Epic    The longitudinal plan of care for the diagnosis(es)/condition(s) as documented were addressed during this visit. Due to the added complexity in care, I will continue to support Anette in the subsequent management and with ongoing continuity of care.          Signed Electronically by: Belinda Richter MD    "

## 2025-05-05 ENCOUNTER — OFFICE VISIT (OUTPATIENT)
Dept: DERMATOLOGY | Facility: CLINIC | Age: 70
End: 2025-05-05
Payer: COMMERCIAL

## 2025-05-05 DIAGNOSIS — D48.9 NEOPLASM OF UNCERTAIN BEHAVIOR: ICD-10-CM

## 2025-05-05 PROCEDURE — 88305 TISSUE EXAM BY PATHOLOGIST: CPT | Performed by: DERMATOLOGY

## 2025-05-05 PROCEDURE — 1126F AMNT PAIN NOTED NONE PRSNT: CPT | Performed by: STUDENT IN AN ORGANIZED HEALTH CARE EDUCATION/TRAINING PROGRAM

## 2025-05-05 PROCEDURE — 11102 TANGNTL BX SKIN SINGLE LES: CPT | Performed by: STUDENT IN AN ORGANIZED HEALTH CARE EDUCATION/TRAINING PROGRAM

## 2025-05-05 ASSESSMENT — PAIN SCALES - GENERAL: PAINLEVEL_OUTOF10: NO PAIN (0)

## 2025-05-05 NOTE — PATIENT INSTRUCTIONS
Wound Care After a Biopsy    What is a skin biopsy?  A skin biopsy allows the doctor to examine a very small piece of tissue under the microscope to determine the diagnosis and the best treatment for the skin condition. A local anesthetic (numbing medicine)  is injected with a very small needle into the skin area to be tested. A small piece of skin is taken from the area. Sometimes a suture (stitch) is used.     What are the risks of a skin biopsy?  I will experience scar, bleeding, swelling, pain, crusting and redness. I may experience incomplete removal or recurrence. Risks of this procedure are excessive bleeding, bruising, infection, nerve damage, numbness, thick (hypertrophic or keloidal) scar and non-diagnostic biopsy.    How should I care for my wound for the first 24 hours?  Keep the wound dry and covered for 24 hours  If it bleeds, hold direct pressure on the area for 15 minutes. If bleeding does not stop then go to the emergency room  Avoid strenuous exercise the first 1-2 days or as your doctor instructs you    How should I care for the wound after 24 hours?  After 24 hours, remove the bandage  You may bathe or shower as normal  If you had a scalp biopsy, you can shampoo as usual and can use shower water to clean the biopsy site daily  Clean the wound twice a day with gentle soap and water  Do not scrub, be gentle  Apply white petroleum/Vaseline after cleaning the wound with a cotton swab or a clean finger, and keep the site covered with a Bandaid /bandage. Bandages are not necessary with a scalp biopsy  If you are unable to cover the site with a Bandaid /bandage, re-apply ointment 2-3 times a day to keep the site moist. Moisture will help with healing  Avoid strenuous activity for first 1-2 days  Avoid lakes, rivers, pools, and oceans until the stitches are removed or the site is healed    How do I clean my wound?  Wash hands thoroughly with soap or use hand  before all wound care  Clean the  wound with gentle soap and water  Apply white petroleum/Vaseline  to wound after it is clean  Replace the Bandaid /bandage to keep the wound covered for the first few days or as instructed by your doctor  If you had a scalp biopsy, warm shower water to the area on a daily basis should suffice    What should I use to clean my wound?   Cotton-tipped applicators (Qtips )  White petroleum jelly (Vaseline ). Use a clean new container and use Q-tips to apply.  Bandaids   as needed  Gentle soap     How should I care for my wound long term?  Do not get your wound dirty  Keep up with wound care for one week or until the area is healed.  A small scab will form and fall off by itself when the area is completely healed. The area will be red and will become pink in color as it heals. Sun protection is very important for how your scar will turn out. Sunscreen with an SPF 30 or greater is recommended once the area is healed.  You should have some soreness but it should be mild and slowly go away over several days. Talk to your doctor about using tylenol for pain,    When should I call my doctor?  If you have increased:   Pain or swelling  Pus or drainage (clear or slightly yellow drainage is ok)  Temperature over 100F  Spreading redness or warmth around wound    When will I hear about my results?  The biopsy results can take 2 weeks to come back.  Your results will automatically release to [employer] before your provider has even reviewed them.  The clinic will call you with the results, send you a [employer] message, or have you schedule a follow-up clinic or phone time to discuss the results.  Contact our clinics if you do not hear from us in 2 weeks. If further treatment is needed for a skin cancer, this will be done at either our [place] or [place] office.    Who should I call with questions?  [facility]: [phone]  [facility]: [phone]  For urgent  needs outside of business hours call the [facility] at [phone] and ask for the dermatology resident on call

## 2025-05-05 NOTE — PROGRESS NOTES
[facility] Dermatology Note  Encounter Date: May 5, 2025  Office Visit     Reviewed patients past medical history and pertinent chart review prior to patients visit today.     Dermatology Problem List:  # Neoplasm of uncertain behavior: right shin, s/p shave biopsy 5/5/2025     Personal Hx: Negative for personal history of skin cancer.   Family Hx: Negative for family history of skin cancer.  ____________________________________________    Assessment & Plan:     # Neoplasm of uncertain behavior:  right shin.  DDx includes NMSC vs melanoma. Shave biopsy today.  Photograph obtained.  Procedure Note: Biopsy by shave technique  The risks and benefits of the procedure were described to the patient. These include but are not limited to bleeding, infection, scar, incomplete removal, and non-diagnostic biopsy. Verbal informed consent was obtained. The above site(s) was cleansed with an alcohol pad and injected with 1% lidocaine with epinephrine. Once anesthesia was obtained, a biopsy(ies) was performed with Gilette blade. The tissue(s) was placed in a labeled container(s) with formalin and sent to pathology. Hemostasis was achieved with aluminum chloride. Vaseline and a bandage were applied to the wound(s). The patient tolerated the procedure well and was given post biopsy care instructions.    Patient requests phone calls at [phone].    Follow up: pending biopsy results.     All risks, benefits and alternatives were discussed with patient.  Patient is in agreement and understands the assessment and plan.  All questions were answered.  Roxanne Retana PA-C  [facility] Dermatology  _______________________________________    CC: Derm Problem (- lump on right shin, present since February, bleeds and oozes, not itchy)     HPI:  Mr. Anette Valdes is a(n) 69 year old male who presents today as a new patient for evaluation of a skin lesion involving the right shin. This lesion has been present since  February 2025.  It has been bleeding and oozing.  It is not painful or pruritic.  It has grown in size.    Patient is otherwise feeling well, without additional skin concerns. Patient is not diligent with photoprotection. He tans well and does not burn easily.    Physical Exam:  SKIN: Focused examination of right shin only was performed per patient request.  - right shin, 1.5 cm, pink dome shaped nodule with vessels and hemorrhagic crust at the base/periphery    - No other lesions of concern on areas examined.               Medications:  Current Outpatient Medications   Medication Sig Dispense Refill    cyanocobalamin (VITAMIN B-12) 500 MCG tablet Take 1 tablet by mouth      donepezil (ARICEPT) 10 MG tablet Take 1 tablet (10 mg) by mouth At Bedtime      losartan (COZAAR) 100 MG tablet Take 50 mg by mouth daily        No current facility-administered medications for this visit.      Past Medical History:   Patient Active Problem List   Diagnosis    Splenic artery aneurysm    Elevated MCV    Elevated blood sugar    Dilated aortic root    Abdominal aortic aneurysm without rupture    Vitamin B12 deficiency (non anemic)    Marfan's syndrome    Alzheimer's disease (H)     Past Medical History:   Diagnosis Date    AAA (abdominal aortic aneurysm) 2012 and 2013    had endovascular repair 11/12 at [facility].  Had open repair 12/13 at [facility] with post op ileus    Alzheimer's disease (H)     dx [place]    B12 deficiency 01/01/2016    found at [facility] [place], started po and shots    Dilated aortic root 01/01/2016    4.3 on echo, found on est echo done for l.h. stable by fu [place] 1/21    Elevated blood sugar     Elevated MCV 01/01/2015    Light headed 12/01/2015    neg est echo    Marfan's syndrome 2020    dx [place]    SBO (small bowel obstruction) (H) 01/01/2007    due to volvulus, had surgery    Splenic artery aneurysm 01/01/2007    had embolization       CC Referred Self, MD  No address on file on close of this encounter.

## 2025-05-05 NOTE — LETTER
5/5/2025      Anette Valdes  [address] [place] [address]      Dear Colleague,    Thank you for referring your patient, Anette Valdes, to the [facility]. Please see a copy of my visit note below.    [facility] Dermatology Note  Encounter Date: May 5, 2025  Office Visit     Reviewed patients past medical history and pertinent chart review prior to patients visit today.     Dermatology Problem List:  # Neoplasm of uncertain behavior: right shin, s/p shave biopsy 5/5/2025     Personal Hx: Negative for personal history of skin cancer.   Family Hx: Negative for family history of skin cancer.  ____________________________________________    Assessment & Plan:     # Neoplasm of uncertain behavior:  right shin.  DDx includes NMSC vs melanoma. Shave biopsy today.  Photograph obtained.  Procedure Note: Biopsy by shave technique  The risks and benefits of the procedure were described to the patient. These include but are not limited to bleeding, infection, scar, incomplete removal, and non-diagnostic biopsy. Verbal informed consent was obtained. The above site(s) was cleansed with an alcohol pad and injected with 1% lidocaine with epinephrine. Once anesthesia was obtained, a biopsy(ies) was performed with Gilette blade. The tissue(s) was placed in a labeled container(s) with formalin and sent to pathology. Hemostasis was achieved with aluminum chloride. Vaseline and a bandage were applied to the wound(s). The patient tolerated the procedure well and was given post biopsy care instructions.    Follow up: pending biopsy results.     All risks, benefits and alternatives were discussed with patient.  Patient is in agreement and understands the assessment and plan.  All questions were answered.  Roxanne Retana PA-C  [facility] Dermatology  _______________________________________    CC: Derm Problem (- lump on right shin, present since February, bleeds and  oozes, not itchy)     HPI:  Mr. Anette Valdes is a(n) 69 year old male who presents today as a new patient for evaluation of a skin lesion involving the right shin. This lesion has been present since February 2025.  It has been bleeding and oozing.  It is not painful or pruritic.  It has grown in size.    Patient is otherwise feeling well, without additional skin concerns. Patient is not diligent with photoprotection. He tans well and does not burn easily.    Physical Exam:  SKIN: Focused examination of right shin only was performed per patient request.  - right shin, 1.5 cm, pink dome shaped nodule with vessels and hemorrhagic crust at the base/periphery    - No other lesions of concern on areas examined.               Medications:  Current Outpatient Medications   Medication Sig Dispense Refill     cyanocobalamin (VITAMIN B-12) 500 MCG tablet Take 1 tablet by mouth       donepezil (ARICEPT) 10 MG tablet Take 1 tablet (10 mg) by mouth At Bedtime       losartan (COZAAR) 100 MG tablet Take 50 mg by mouth daily        No current facility-administered medications for this visit.      Past Medical History:   Patient Active Problem List   Diagnosis     Splenic artery aneurysm     Elevated MCV     Elevated blood sugar     Dilated aortic root     Abdominal aortic aneurysm without rupture     Vitamin B12 deficiency (non anemic)     Marfan's syndrome     Alzheimer's disease (H)     Past Medical History:   Diagnosis Date     AAA (abdominal aortic aneurysm) 2012 and 2013    had endovascular repair 11/12 at [facility].  Had open repair 12/13 at [facility] with post op ileus     Alzheimer's disease (H)     dx [place]     B12 deficiency 01/01/2016    found at [facility] [place], started po and shots     Dilated aortic root 01/01/2016    4.3 on echo, found on est echo done for l.h. stable by fu [place] 1/21     Elevated blood sugar      Elevated MCV 01/01/2015     Light headed 12/01/2015    neg est echo     Marfan's syndrome 2020    dx [place]     SBO  (small bowel obstruction) (H) 01/01/2007    due to volvulus, had surgery     Splenic artery aneurysm 01/01/2007    had embolization       CC Referred Self, MD  No address on file on close of this encounter.       Again, thank you for allowing me to participate in the care of your patient.        Sincerely,        Anel Retana PA-C    Electronically signed

## 2025-05-07 LAB
PATH REPORT.COMMENTS IMP SPEC: ABNORMAL
PATH REPORT.COMMENTS IMP SPEC: ABNORMAL
PATH REPORT.COMMENTS IMP SPEC: YES
PATH REPORT.FINAL DX SPEC: ABNORMAL
PATH REPORT.GROSS SPEC: ABNORMAL
PATH REPORT.MICROSCOPIC SPEC OTHER STN: ABNORMAL
PATH REPORT.RELEVANT HX SPEC: ABNORMAL

## 2025-05-15 ENCOUNTER — PATIENT OUTREACH (OUTPATIENT)
Dept: CARE COORDINATION | Facility: CLINIC | Age: 70
End: 2025-05-15
Payer: COMMERCIAL

## 2025-05-21 ENCOUNTER — OFFICE VISIT (OUTPATIENT)
Dept: DERMATOLOGY | Facility: CLINIC | Age: 70
End: 2025-05-21
Payer: COMMERCIAL

## 2025-05-21 DIAGNOSIS — Z85.828 HISTORY OF NONMELANOMA SKIN CANCER: ICD-10-CM

## 2025-05-21 DIAGNOSIS — D18.01 CHERRY ANGIOMA: ICD-10-CM

## 2025-05-21 DIAGNOSIS — D22.9 MULTIPLE NEVI: ICD-10-CM

## 2025-05-21 DIAGNOSIS — Z12.83 SKIN CANCER SCREENING: Primary | ICD-10-CM

## 2025-05-21 DIAGNOSIS — L82.1 SEBORRHEIC KERATOSIS: ICD-10-CM

## 2025-05-21 DIAGNOSIS — L81.4 LENTIGO: ICD-10-CM

## 2025-05-21 NOTE — PROGRESS NOTES
[facility] Dermatology Note  Encounter Date: May 21, 2025  Office Visit     Reviewed patients past medical history and pertinent chart review prior to patients visit today.     Dermatology Problem List:  Last skin check: 5/21/2025  # History of NMSC  - nBCC, right shin, s/p shave biopsy 5/5/2025, scheduled for Mohs on 7/10/2025    # Neoplasm of uncertain behavior: right shin, s/p shave biopsy     Family Hx: Negative for family history of skin cancer.  ____________________________________________    Assessment & Plan:     # Personal history of nonmelanoma skin cancer  - Biopsy-proven nodular basal cell carcinoma involving the right shin status post biopsy on 5/5/2025.  He is scheduled for Mohs micrographic surgery on 7/10/2025 with Dr. Crespo.  - Advised to monitor for changing, non-healing, bleeding, painful, changing, or otherwise symptomatic lesions  - Sun protection: Counseled SPF 30+ sunscreen, UPF clothing, sun avoidance, tanning bed avoidance.    # Multiple nevi, trunk and extremities  # Solar lentigines  - Nevi demonstrate no concerning features on dermoscopy. We discussed the importance of self exams at home.   - ABCDEs: Counseled ABCDEs of melanoma: Asymmetry, Border (irregularity), Color (not uniform, changes in color), Diameter (greater than 6 mm which is about the size of a pencil eraser), and Evolving (any changes in preexisting moles).    # Cherry angiomas  # Seborrheic keratoses  - We discussed the benign nature of the skin lesions. No treatment required. Continued observation recommended. Follow up with any concerns.      Follow-up:  6 months for follow up full body skin exam, as needed for new or changing lesions or new concerns    All risks, benefits and alternatives were discussed with patient.  Patient is in agreement and understands the assessment and plan.  All questions were answered.  [facility]  Dermatology  ____________________________________________    CC: [place] concerns[place]     HPI:  Mr. Anette Valdes is a(n) 69 year old male who presents today as a return patient for a full body skin cancer screening. The patient has a history of nonmelanoma skin cancer. The patient was last seen in dermatology on 5/5/2025 at which time a biopsy from the right shin was obtained and revealed a nodular basal cell carcinoma.  He is scheduled for Mohs micrographic surgery with Dr. Crespo on 7/10/2025.     Patient has no specific cutaneous concerns today.    Patient is diligent with photoprotection. Patient is otherwise feeling well, without additional skin concerns.     Physical Exam:  Vitals: There were no vitals taken for this visit.   SKIN: Total skin excluding the genitalia areas was performed. The exam included the scalp, face, neck, bilateral arms, chest, back, abdomen, bilateral legs, digits, mons pubis, buttocks, and nails.   - Weiss II.  -Residual basal cell carcinoma involving the right shin, scheduled for mohs on 7/10/2025  -multiple tan/brown flat round macules and raised papules scattered throughout trunk, extremities, and face. No worrisome features for malignancy noted on examination.  -scattered tan, homogenous macules scattered on sun exposed areas of trunk, extremities, and face  -scattered waxy, stuck on tan/brown papules and patches on the trunk and extremities  -several 1-2mm red dome shaped symmetric papules scattered on the trunk and extremities    - No other lesions of concern on areas examined.     Medications:  Current Outpatient Medications   Medication Sig Dispense Refill    cyanocobalamin (VITAMIN B-12) 500 MCG tablet Take 1 tablet by mouth      donepezil (ARICEPT) 10 MG tablet Take 1 tablet (10 mg) by mouth At Bedtime      losartan (COZAAR) 100 MG tablet Take 50 mg by mouth daily        No current facility-administered medications for this visit.      Past Medical History:    Patient Active Problem List   Diagnosis    Splenic artery aneurysm    Elevated MCV    Elevated blood sugar    Dilated aortic root    Abdominal aortic aneurysm without rupture    Vitamin B12 deficiency (non anemic)    Marfan's syndrome    Alzheimer's disease (H)     Past Medical History:   Diagnosis Date    AAA (abdominal aortic aneurysm) 2012 and 2013    had endovascular repair 11/12 at [facility].  Had open repair 12/13 at [facility] with post op ileus    Alzheimer's disease (H)     dx [place]    B12 deficiency 01/01/2016    found at [facility] [place], started po and shots    Dilated aortic root 01/01/2016    4.3 on echo, found on est echo done for l.h. stable by fu [place] 1/21    Elevated blood sugar     Elevated MCV 01/01/2015    Light headed 12/01/2015    neg est echo    Marfan's syndrome 2020    dx [place]    SBO (small bowel obstruction) (H) 01/01/2007    due to volvulus, had surgery    Splenic artery aneurysm 01/01/2007    had embolization       CC Referred Self, MD  No address on file on close of this encounter.

## 2025-05-21 NOTE — PATIENT INSTRUCTIONS
Patient Education       Proper skin care from [facility]:    -Eliminate harsh soaps as they strip the natural oils from the skin, often resulting in dry itchy skin ( i.e. Dial, Zest, Sami Spring)  -Use mild soaps such as Cetaphil or Dove Sensitive Skin in the shower. You do not need to use soap on arms, legs, and trunk every time you shower unless visibly soiled.   -Avoid hot or cold showers.  -After showering, lightly dry off and apply moisturizing within 2-3 minutes. This will help trap moisture in the skin.   -Aggressive use of a moisturizer at least 1-2 times a day to the entire body (including -Vanicream, Cetaphil, Aquaphor or Cerave) and moisturize hands after every washing.  -We recommend using moisturizers that come in a tub that needs to be scooped out, not a pump. This has more of an oil base. It will hold moisture in your skin much better than a water base moisturizer. The above recommended are non-pore clogging.      Wear a sunscreen with at least SPF 30 on your face, ears, neck and V of the chest daily. Wear sunscreen on other areas of the body if those areas are exposed to the sun throughout the day. Sunscreens can contain physical and/or chemical blockers. Physical blockers are less likely to clog pores, these include zinc oxide and titanium dioxide. Reapply every two hour and after swimming.     Sunscreen examples: https://www.ewg.org/sunscreen/    UV radiation  UVA radiation remains constant throughout the day and throughout the year. It is a longer wavelength than UVB and therefore penetrates deeper into the skin leading to immediate and delayed tanning, photoaging, and skin cancer. 70-80% of UVA and UVB radiation occurs between the hours of 10am-2pm.  UVB radiation  UVB radiation causes the most harmful effects and is more significant during the summer months. However, snow and ice can reflect UVB radiation leading to skin damage during the winter months as well. UVB radiation is  responsible for tanning, burning, inflammation, delayed erythema (pinkness), pigmentation (brown spots), and skin cancer.     I recommend self monthly full body exams and yearly full body exams with a dermatology provider. If you develop a new or changing lesion please follow up for examination. Most skin cancers are pink and scaly or pink and pearly. However, we do see blue/brown/black skin cancers.  Consider the ABCDEs of melanoma when giving yourself your monthly full body exam ( don't forget the groin, buttocks, feet, toes, etc). A-asymmetry, B-borders, C-color, D-diameter, E-elevation or evolving. If you see any of these changes please follow up in clinic. If you cannot see your back I recommend purchasing a hand held mirror to use with a larger wall mirror.       Checking for Skin Cancer  You can find cancer early by checking your skin each month. There are 3 kinds of skin cancer. They are melanoma, basal cell carcinoma, and squamous cell carcinoma. Doing monthly skin checks is the best way to find new marks or skin changes. Follow the instructions below for checking your skin.   The ABCDEs of checking moles for melanoma   Check your moles or growths for signs of melanoma using ABCDE:   Asymmetry: the sides of the mole or growth don t match  Border: the edges are ragged, notched, or blurred  Color: the color within the mole or growth varies  Diameter: the mole or growth is larger than 6 mm (size of a pencil eraser)  Evolving: the size, shape, or color of the mole or growth is changing (evolving is not shown in the images below)    Checking for other types of skin cancer  Basal cell carcinoma or squamous cell carcinoma have symptoms such as:     A spot or mole that looks different from all other marks on your skin  Changes in how an area feels, such as itching, tenderness, or pain  Changes in the skin's surface, such as oozing, bleeding, or scaliness  A sore that does not heal  New swelling or redness beyond  the border of a mole    Who s at risk?  Anyone can get skin cancer. But you are at greater risk if you have:   Fair skin, light-colored hair, or light-colored eyes  Many moles or abnormal moles on your skin  A history of sunburns from sunlight or tanning beds  A family history of skin cancer  A history of exposure to radiation or chemicals  A weakened immune system  If you have had skin cancer in the past, you are at risk for recurring skin cancer.   How to check your skin  Do your monthly skin checkups in front of a full-length mirror. Check all parts of your body, including your:   Head (ears, face, neck, and scalp)  Torso (front, back, and sides)  Arms (tops, undersides, upper, and lower armpits)  Hands (palms, backs, and fingers, including under the nails)  Buttocks and genitals  Legs (front, back, and sides)  Feet (tops, soles, toes, including under the nails, and between toes)  If you have a lot of moles, take digital photos of them each month. Make sure to take photos both up close and from a distance. These can help you see if any moles change over time.   Most skin changes are not cancer. But if you see any changes in your skin, call your doctor right away. Only he or she can diagnose a problem. If you have skin cancer, seeing your doctor can be the first step toward getting the treatment that could save your life.   [employer] last reviewed this educational content on 4/1/2019 2000-2020 [employer]. [address] [place] [address], [place] [address]. All rights reserved. This information is not intended as a substitute for professional medical care. Always follow your healthcare professional's instructions.       When should I call my doctor?  If you are worsening or not improving, please, contact us or seek urgent care as noted below.     Who should I call with questions (adults)?  [facility] (adult and pediatric): [phone]  [facility]  [place] (adult): [phone]  [facility] ([place], [place], [place] and [place]) [phone]  For urgent needs outside of business hours call the [facility] at [phone] and ask for the dermatology resident on call to be paged  If this is a medical emergency and you are unable to reach an ER, Call 911      If you need a prescription refill, please contact your pharmacy. Refills are approved or denied by our Physicians during normal business hours, Monday through Fridays  Per office policy, refills will not be granted if you have not been seen within the past year (or sooner depending on your child's condition

## 2025-05-21 NOTE — LETTER
5/21/2025      Anette Valdes  [address] [place] [address]      Dear Colleague,    Thank you for referring your patient, Anette Valdes, to the [facility]. Please see a copy of my visit note below.    [facility] Note  Encounter Date: May 21, 2025  Office Visit     Reviewed patients past medical history and pertinent chart review prior to patients visit today.     Dermatology Problem List:  Last skin check: 5/21/2025  # History of NMSC  - nBCC, right shin, s/p shave biopsy 5/5/2025, scheduled for Mohs on 7/10/2025    # Neoplasm of uncertain behavior: right shin, s/p shave biopsy     Family Hx: Negative for family history of skin cancer.  ____________________________________________    Assessment & Plan:     # Personal history of nonmelanoma skin cancer  - Biopsy-proven nodular basal cell carcinoma involving the right shin status post biopsy on 5/5/2025.  He is scheduled for Mohs micrographic surgery on 7/10/2025 with Dr. Crespo.  - Advised to monitor for changing, non-healing, bleeding, painful, changing, or otherwise symptomatic lesions  - Sun protection: Counseled SPF 30+ sunscreen, UPF clothing, sun avoidance, tanning bed avoidance.    # Multiple nevi, trunk and extremities  # Solar lentigines  - Nevi demonstrate no concerning features on dermoscopy. We discussed the importance of self exams at home.   - ABCDEs: Counseled ABCDEs of melanoma: Asymmetry, Border (irregularity), Color (not uniform, changes in color), Diameter (greater than 6 mm which is about the size of a pencil eraser), and Evolving (any changes in preexisting moles).    # Cherry angiomas  # Seborrheic keratoses  - We discussed the benign nature of the skin lesions. No treatment required. Continued observation recommended. Follow up with any concerns.      Follow-up:  6 months for follow up full body skin exam, as needed for new or changing lesions or new concerns    All  risks, benefits and alternatives were discussed with patient.  Patient is in agreement and understands the assessment and plan.  All questions were answered.  Roxanne Retana PA-C  [facility]  ____________________________________________    CC: Skin Check (FBSC/No concerns)     HPI:  Mr. Anette Valdes is a(n) 69 year old male who presents today as a return patient for a full body skin cancer screening. The patient has a history of nonmelanoma skin cancer. The patient was last seen in dermatology on 5/5/2025 at which time a biopsy from the right shin was obtained and revealed a nodular basal cell carcinoma.  He is scheduled for Mohs micrographic surgery with Dr. Crespo on 7/10/2025.     Patient has no specific cutaneous concerns today.    Patient is diligent with photoprotection. Patient is otherwise feeling well, without additional skin concerns.     Physical Exam:  Vitals: There were no vitals taken for this visit.   SKIN: Total skin excluding the genitalia areas was performed. The exam included the scalp, face, neck, bilateral arms, chest, back, abdomen, bilateral legs, digits, mons pubis, buttocks, and nails.   - Weiss II.  -Residual basal cell carcinoma involving the right shin, scheduled for mohs on 7/10/2025  -multiple tan/brown flat round macules and raised papules scattered throughout trunk, extremities, and face. No worrisome features for malignancy noted on examination.  -scattered tan, homogenous macules scattered on sun exposed areas of trunk, extremities, and face  -scattered waxy, stuck on tan/brown papules and patches on the trunk and extremities  -several 1-2mm red dome shaped symmetric papules scattered on the trunk and extremities    - No other lesions of concern on areas examined.     Medications:  Current Outpatient Medications   Medication Sig Dispense Refill     cyanocobalamin (VITAMIN B-12) 500 MCG tablet Take 1 tablet by mouth       donepezil (ARICEPT) 10 MG tablet  Take 1 tablet (10 mg) by mouth At Bedtime       losartan (COZAAR) 100 MG tablet Take 50 mg by mouth daily        No current facility-administered medications for this visit.      Past Medical History:   Patient Active Problem List   Diagnosis     Splenic artery aneurysm     Elevated MCV     Elevated blood sugar     Dilated aortic root     Abdominal aortic aneurysm without rupture     Vitamin B12 deficiency (non anemic)     Marfan's syndrome     Alzheimer's disease (H)     Past Medical History:   Diagnosis Date     AAA (abdominal aortic aneurysm) 2012 and 2013    had endovascular repair 11/12 at [facility].  Had open repair 12/13 at [facility] with post op ileus     Alzheimer's disease (H)     dx [place]     B12 deficiency 01/01/2016    found at [facility] [place], started po and shots     Dilated aortic root 01/01/2016    4.3 on echo, found on est echo done for l.h. stable by fu [place] 1/21     Elevated blood sugar      Elevated MCV 01/01/2015     Light headed 12/01/2015    neg est echo     Marfan's syndrome 2020    dx [place]     SBO (small bowel obstruction) (H) 01/01/2007    due to volvulus, had surgery     Splenic artery aneurysm 01/01/2007    had embolization       CC Referred Self, MD  No address on file on close of this encounter.    Again, thank you for allowing me to participate in the care of your patient.        Sincerely,        Anel Retana PA-C    Electronically signed

## 2025-06-14 ENCOUNTER — HEALTH MAINTENANCE LETTER (OUTPATIENT)
Age: 70
End: 2025-06-14

## 2025-07-10 ENCOUNTER — OFFICE VISIT (OUTPATIENT)
Dept: DERMATOLOGY | Facility: CLINIC | Age: 70
End: 2025-07-10
Payer: COMMERCIAL

## 2025-07-10 DIAGNOSIS — D23.9 DERMAL NEVUS: Primary | ICD-10-CM

## 2025-07-10 DIAGNOSIS — C44.712 BASAL CELL CARCINOMA OF LEG, RIGHT: ICD-10-CM

## 2025-07-10 DIAGNOSIS — L81.4 LENTIGO: ICD-10-CM

## 2025-07-10 DIAGNOSIS — D18.01 ANGIOMA OF SKIN: ICD-10-CM

## 2025-07-10 DIAGNOSIS — L82.1 SEBORRHEIC KERATOSES: ICD-10-CM

## 2025-07-10 ASSESSMENT — PAIN SCALES - GENERAL: PAINLEVEL_OUTOF10: NO PAIN (0)

## 2025-07-10 NOTE — PROGRESS NOTES
Anette Valdes , a 70 year old year old male patient, I was asked to see by ABHIJIT Mosquera for basal cell carcinoma on right shin.  Patient has no other skin complaints today.  Remainder of the HPI, Meds, PMH, Allergies, FH, and SH was reviewed in chart.      Past Medical History:   Diagnosis Date    AAA (abdominal aortic aneurysm) 2012 and 2013    had endovascular repair 11/12 at [facility].  Had open repair 12/13 at [facility] with post op ileus    Alzheimer's disease (H)     dx [place]    B12 deficiency 01/01/2016    found at [facility] [place], started po and shots    Basal cell carcinoma     Dilated aortic root 01/01/2016    4.3 on echo, found on est echo done for l.h. stable by fu [place] 1/21    Elevated blood sugar     Elevated MCV 01/01/2015    Light headed 12/01/2015    neg est echo    Marfan's syndrome 2020    dx [place]    SBO (small bowel obstruction) (H) 01/01/2007    due to volvulus, had surgery    Splenic artery aneurysm 01/01/2007    had embolization       Past Surgical History:   Procedure Laterality Date    bunion surgery  2008    CATARACT IOL, RT/LT  1994    ENDOVASCULAR REPAIR ANEURYSM ABDOMINAL AORTA  11/12    done at [facility] for aaa    FOOT SURGERY  2000    due to fx    left knee surgery  1998 and 1979    OTHER SURGICAL HISTORY  12/13    done at [facility], open repair of aaa    ROTATOR CUFF REPAIR RT/LT  2001    splenic artery embolization  2007    varocose vein repaired  1970's and 1980    volvulus surgery  2007        Family History   Problem Relation Age of Onset    Hypertension Mother     Cerebrovascular Disease Mother     Hypertension Brother     Prostate Cancer Brother     Skin Cancer No family hx of        Social History     Socioeconomic History    Marital status:      Spouse name: Not on file    Number of children: 2    Years of education: Not on file    Highest education level: Not on file   Occupational History    Not on file   Tobacco Use    Smoking status: Never    Smokeless tobacco: Never   Vaping Use     Vaping status: Never Used   Substance and Sexual Activity    Alcohol use: Yes     Comment: minimal    Drug use: No    Sexual activity: Yes     Partners: Female   Other Topics Concern    Parent/sibling w/ CABG, MI or angioplasty before 65F 55M? No   Social History Narrative    Not on file     Social Drivers of Health     Financial Resource Strain: Low Risk  (9/13/2023)    Received from [facility]    Overall Financial Resource Strain (CARDIA)     Difficulty of Paying Living Expenses: Not hard at all   Food Insecurity: No Food Insecurity (9/29/2024)    Received from [facility]    Hunger Vital Sign     Worried About Running Out of Food in the Last Year: Never true     Ran Out of Food in the Last Year: Never true   Transportation Needs: No Transportation Needs (9/29/2024)    Received from [facility]    PRAPARE - Transportation     Lack of Transportation (Medical): No     Lack of Transportation (Non-Medical): No   Physical Activity: Sufficiently Active (9/29/2024)    Received from [facility]    Exercise Vital Sign     Days of Exercise per Week: 7 days     Minutes of Exercise per Session: 40 min   Stress: No Stress Concern Present (7/23/2022)    Received from [facility]    [employer] [place] [employer] - Occupational Stress Questionnaire     Feeling of Stress : Not at all   Social Connections: Moderately Isolated (7/23/2022)    Received from [facility]    Social Connection and Isolation Panel [NHANES]     Frequency of Communication with Friends and Family: Twice a week     Frequency of Social Gatherings with Friends and Family: Once a week     Attends Bahai Services: Never     Active Member of Clubs or Organizations: No     Attends Club or Organization Meetings: Never     Marital Status:    Interpersonal Safety: Not At Risk (8/30/2023)    Received from [facility]    Humiliation, Afraid, Rape, and Kick questionnaire     Fear of Current or Ex-Partner: No     Emotionally Abused: No     Physically  Abused: No     Sexually Abused: No   Housing Stability: Low Risk  (9/29/2024)    Received from [facility]    Housing Stability     What is your living situation today?: I have a steady place to live       Outpatient Encounter Medications as of 7/10/2025   Medication Sig Dispense Refill    cyanocobalamin (VITAMIN B-12) 500 MCG tablet Take 1 tablet by mouth      donepezil (ARICEPT) 10 MG tablet Take 1 tablet (10 mg) by mouth At Bedtime      losartan (COZAAR) 100 MG tablet Take 50 mg by mouth daily        No facility-administered encounter medications on file as of 7/10/2025.             Review Of Systems  Skin: As above  Eyes: negative  Ears/Nose/Throat: negative  Respiratory: No shortness of breath, dyspnea on exertion, cough, or hemoptysis  Cardiovascular: negative  Gastrointestinal: negative  Genitourinary: negative  Musculoskeletal: negative  Neurologic: negative  Psychiatric: negative  Hematologic/Lymphatic/Immunologic: negative  Endocrine: negative      O:   NAD, WDWN, Alert & Oriented, Mood & Affect wnl, Vitals stable   General appearance dani ii   Vitals stable   Alert, oriented and in no acute distress   R shin 8mm pink pearly papule    Stuck on papules and brown macules on trunk and ext    Red papules on trunk   Flesh colored papules on trunk          Eyes: Conjunctivae/lids:Normal     ENT: Lips, mucosa: normal    MSK:Normal    Cardiovascular: peripheral edema none    Pulm: Breathing Normal    Neuro/Psych: Orientation:Normal; Mood/Affect:Normal      A/P:  1. Seborrheic keratosis, lentigo, angioma, dermal nevus  2. R shin basal cell carcinoma   It was a pleasure speaking to Anette Valdes today.  Previous clinic  notes and pertinent laboratory tests were reviewed prior to Anette Valdes's visit.  Signs and Symptoms of skin cancer discussed with patient.  Patient encouraged to perform monthly skin exams.  UV precautions reviewed with patient.  Risks of non-melanoma skin cancer discussed with patient   Return to  clinic 6 months  PROCEDURE NOTE  R shin basal cell carcinoma   MOHS:   Location    The rationale for Mohs surgery was discussed with the patient and consent was obtained.  The risks and benefits as well as alternatives to therapy were discussed, in detail.  Specifically, the risks of infection, scarring, bleeding, prolonged wound healing, incomplete removal, allergy to anesthesia, nerve injury and recurrence were addressed.  Indication for Mohs was Location. Prior to the procedure, the treatment site was clearly identified and, if available, confirmed with previous photos and confirmed by the patient   All components of the Universal Protocol/PAUSE rule were completed.  The Mohs surgeon operated in two distinct and integrated capacities as the surgeon and pathologist.      The area was prepped with Betasept.  A rim of normal appearing skin was marked circumferentially around the lesion.  The area was infiltrated with local anesthesia.  The tumor was first debulked to remove all clinically apparent tumor.  An incision following the standard Mohs approach was done and the specimen was oriented,mapped and placed in 1 block(s).  Each specimen was then chromacoded and processed in the Mohs laboratory using standard Mohs technique and submitted for frozen section histology.  Frozen section analysis showed no residual tumor but CLEAR MARGINS.      The tumor was excised using standard Mohs technique in 1 stages(s).  CLEAR MARGINS OBTAINED and Final defect size was 1.2 cm.     We discussed the options for wound management in full with the patient including risks/benefits/ possible outcomes.      REPAIR SECOND INTENT: We discussed the options for wound management in full with the patient including risks/benefits/possible outcomes. Decision made to allow the wound to heal by second intention. Cautery was used for for hemostasis. EBL minimal; complications none; wound care routine.  The patient was discharged in good condition  and will return in one month or prn for wound evaluation.

## 2025-07-10 NOTE — LETTER
7/10/2025      Anette Valdes  [address]  [place] [address]      Dear Colleague,    Thank you for referring your patient, Anette Valdes, to the [facility]. Please see a copy of my visit note below.    Surgical Office Location:  [facility]  [address] [place] [address]      Anette Valdes , a 70 year old year old male patient, I was asked to see by ABHIJIT Mosquera for basal cell carcinoma on right shin.  Patient has no other skin complaints today.  Remainder of the HPI, Meds, PMH, Allergies, FH, and SH was reviewed in chart.      Past Medical History:   Diagnosis Date     AAA (abdominal aortic aneurysm) 2012 and 2013    had endovascular repair 11/12 at [facility].  Had open repair 12/13 at [facility] with post op ileus     Alzheimer's disease (H)     dx [place]     B12 deficiency 01/01/2016    found at [facility] [place], started po and shots     Basal cell carcinoma      Dilated aortic root 01/01/2016    4.3 on echo, found on est echo done for l.h. stable by [facility] [place] 1/21     Elevated blood sugar      Elevated MCV 01/01/2015     Light headed 12/01/2015    neg est echo     Marfan's syndrome 2020    dx [place]     SBO (small bowel obstruction) (H) 01/01/2007    due to volvulus, had surgery     Splenic artery aneurysm 01/01/2007    had embolization       Past Surgical History:   Procedure Laterality Date     bunion surgery  2008     CATARACT IOL, RT/LT  1994     ENDOVASCULAR REPAIR ANEURYSM ABDOMINAL AORTA  11/12    done at [facility] for aaa     FOOT SURGERY  2000    due to fx     left knee surgery  1998 and 1979     OTHER SURGICAL HISTORY  12/13    done at [facility], open repair of aaa     ROTATOR CUFF REPAIR RT/LT  2001     splenic artery embolization  2007     varocose vein repaired  1970's and 1980     volvulus surgery  2007        Family History   Problem Relation Age of Onset     Hypertension Mother      Cerebrovascular Disease Mother      Hypertension Brother      Prostate  Cancer Brother      Skin Cancer No family hx of        Social History     Socioeconomic History     Marital status:      Spouse name: Not on file     Number of children: 2     Years of education: Not on file     Highest education level: Not on file   Occupational History     Not on file   Tobacco Use     Smoking status: Never     Smokeless tobacco: Never   Vaping Use     Vaping status: Never Used   Substance and Sexual Activity     Alcohol use: Yes     Comment: minimal     Drug use: No     Sexual activity: Yes     Partners: Female   Other Topics Concern     Parent/sibling w/ CABG, MI or angioplasty before 65F 55M? No   Social History Narrative     Not on file     Social Drivers of Health     Financial Resource Strain: Low Risk  (9/13/2023)    Received from [facility]    Overall Financial Resource Strain (CARDIA)      Difficulty of Paying Living Expenses: Not hard at all   Food Insecurity: No Food Insecurity (9/29/2024)    Received from [facility]    Hunger Vital Sign      Worried About Running Out of Food in the Last Year: Never true      Ran Out of Food in the Last Year: Never true   Transportation Needs: No Transportation Needs (9/29/2024)    Received from [facility]    PRAPARE - Transportation      Lack of Transportation (Medical): No      Lack of Transportation (Non-Medical): No   Physical Activity: Sufficiently Active (9/29/2024)    Received from [facility]    Exercise Vital Sign      Days of Exercise per Week: 7 days      Minutes of Exercise per Session: 40 min   Stress: No Stress Concern Present (7/23/2022)    Received from [facility]    [employer] - Occupational Stress Questionnaire      Feeling of Stress : Not at all   Social Connections: Moderately Isolated (7/23/2022)    Received from [facility]    Social Connection and Isolation Panel [NHANES]      Frequency of Communication with Friends and Family: Twice a week      Frequency of Social Gatherings with Friends  and Family: Once a week      Attends Congregation Services: Never      Active Member of Clubs or Organizations: No      Attends Club or Organization Meetings: Never      Marital Status:    Interpersonal Safety: Not At Risk (8/30/2023)    Received from [facility]    Humiliation, Afraid, Rape, and Kick questionnaire      Fear of Current or Ex-Partner: No      Emotionally Abused: No      Physically Abused: No      Sexually Abused: No   Housing Stability: Low Risk  (9/29/2024)    Received from [facility]    Housing Stability      What is your living situation today?: I have a steady place to live       Outpatient Encounter Medications as of 7/10/2025   Medication Sig Dispense Refill     cyanocobalamin (VITAMIN B-12) 500 MCG tablet Take 1 tablet by mouth       donepezil (ARICEPT) 10 MG tablet Take 1 tablet (10 mg) by mouth At Bedtime       losartan (COZAAR) 100 MG tablet Take 50 mg by mouth daily        No facility-administered encounter medications on file as of 7/10/2025.             Review Of Systems  Skin: As above  Eyes: negative  Ears/Nose/Throat: negative  Respiratory: No shortness of breath, dyspnea on exertion, cough, or hemoptysis  Cardiovascular: negative  Gastrointestinal: negative  Genitourinary: negative  Musculoskeletal: negative  Neurologic: negative  Psychiatric: negative  Hematologic/Lymphatic/Immunologic: negative  Endocrine: negative      O:   NAD, WDWN, Alert & Oriented, Mood & Affect wnl, Vitals stable   General appearance dani ii   Vitals stable   Alert, oriented and in no acute distress   R shin 8mm pink pearly papule    Stuck on papules and brown macules on trunk and ext    Red papules on trunk   Flesh colored papules on trunk          Eyes: Conjunctivae/lids:Normal     ENT: Lips, mucosa: normal    MSK:Normal    Cardiovascular: peripheral edema none    Pulm: Breathing Normal    Neuro/Psych: Orientation:Normal; Mood/Affect:Normal      A/P:  1. Seborrheic keratosis, lentigo, angioma, dermal  nevus  2. R shin basal cell carcinoma   It was a pleasure speaking to Adinaakil COUGHLIN Keisha today.  Previous clinic  notes and pertinent laboratory tests were reviewed prior to Anette Valdes's visit.  Signs and Symptoms of skin cancer discussed with patient.  Patient encouraged to perform monthly skin exams.  UV precautions reviewed with patient.  Risks of non-melanoma skin cancer discussed with patient   Return to clinic 6 months  PROCEDURE NOTE  R shin basal cell carcinoma   MOHS:   Location    The rationale for Mohs surgery was discussed with the patient and consent was obtained.  The risks and benefits as well as alternatives to therapy were discussed, in detail.  Specifically, the risks of infection, scarring, bleeding, prolonged wound healing, incomplete removal, allergy to anesthesia, nerve injury and recurrence were addressed.  Indication for Mohs was Location. Prior to the procedure, the treatment site was clearly identified and, if available, confirmed with previous photos and confirmed by the patient   All components of the Universal Protocol/PAUSE rule were completed.  The Mohs surgeon operated in two distinct and integrated capacities as the surgeon and pathologist.      The area was prepped with Betasept.  A rim of normal appearing skin was marked circumferentially around the lesion.  The area was infiltrated with local anesthesia.  The tumor was first debulked to remove all clinically apparent tumor.  An incision following the standard Mohs approach was done and the specimen was oriented,mapped and placed in 1 block(s).  Each specimen was then chromacoded and processed in the Mohs laboratory using standard Mohs technique and submitted for frozen section histology.  Frozen section analysis showed no residual tumor but CLEAR MARGINS.      The tumor was excised using standard Mohs technique in 1 stages(s).  CLEAR MARGINS OBTAINED and Final defect size was 1.2 cm.     We discussed the options for wound management  in full with the patient including risks/benefits/ possible outcomes.      REPAIR SECOND INTENT: We discussed the options for wound management in full with the patient including risks/benefits/possible outcomes. Decision made to allow the wound to heal by second intention. Cautery was used for for hemostasis. EBL minimal; complications none; wound care routine.  The patient was discharged in good condition and will return in one month or prn for wound evaluation.        Again, thank you for allowing me to participate in the care of your patient.        Sincerely,        Felton Crespo MD    Electronically signed

## 2025-07-10 NOTE — PATIENT INSTRUCTIONS
Open Wound Care     for ______________        No strenuous activity for 48 hours    Take Tylenol as needed for discomfort.                                                .         Do not drink alcoholic beverages for 48 hours.    Keep the pressure bandage in place for 24 hours. If the bandage becomes blood tinged or loose, reinforce it with gauze and tape.        (Refer to the reverse side of this page for management of bleeding).    Remove bandage in 24 hours and begin wound care as follows:     Clean area with tap water using a Q tip or gauze pad, (shower / bathe normally)  Dry wound with Q tip or gauze pad  Apply Aquaphor, Vaseline, Polysporin or Bacitracin Ointment with a Q tip  Do NOT use Neosporin Ointment *  Cover the wound with a band-aid or nonstick gauze pad and paper tape.  Repeat wound care once a day until wound is completely healed.    It is an old wives tale that a wound heals better when it is exposed to air and allowed to dry out. The wound will heal faster with a better cosmetic result if it is kept moist with ointment and covered with a bandage.  Do not let the wound dry out.      Supplies Needed:                Qtips or gauze pads                Polysporin or Bacitracin Ointment                Bandaids or nonstick gauze pads and paper tape    Wound care kits and brown paper tape are available for purchase at   the pharmacy.    BLEEDING:    Use tightly rolled up gauze or cloth to apply direct pressure over the bandage for 20   minutes.  Reapply pressure for an additional 20 minutes if necessary  Call the office or go to the nearest emergency room if pressure fails to stop the bleeding.  Use additional gauze and tape to maintain pressure once the bleeding has stopped.  Begin wound care 24 hours after surgery as directed.    SIGNS OF INFECTION   If you notice any of these signs of infection, call your doctor right away: expanding redness around the wound.  Yellow or greenish-colored pus or cloudy  wound drainage.    Red streaking spreading from the wound.  Increased swelling, tenderness, or pain around the wound.   Fever.                WOUND HEALING    One week after surgery a pink / red halo will form around the outside of the wound.   This is new skin.  The center of the wound will appear yellowish white and produce some drainage.  The pink halo will slowly migrate in toward the center of the wound until the wound is covered with new shiny pink skin.  There will be no more drainage when the wound is completely healed.  It will take six months to one year for the redness to fade.  The scar may be itchy, tight and sensitive to extreme temperatures for a year after the surgery.  Massaging the area several times a day for several minutes after the wound is completely healed will help the scar soften and normalize faster. Begin massage only after healing is complete.      In case of emergency call: Dr Crespo: [phone]    [facility]: [phone]    [facility]:[phone]